# Patient Record
Sex: MALE | Race: WHITE | NOT HISPANIC OR LATINO | Employment: FULL TIME | URBAN - NONMETROPOLITAN AREA
[De-identification: names, ages, dates, MRNs, and addresses within clinical notes are randomized per-mention and may not be internally consistent; named-entity substitution may affect disease eponyms.]

---
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
## 2021-04-12 ENCOUNTER — OPTICAL OFFICE (OUTPATIENT)
Dept: URBAN - NONMETROPOLITAN AREA CLINIC 5 | Facility: CLINIC | Age: 21
Setting detail: OPHTHALMOLOGY
End: 2021-04-12
Payer: COMMERCIAL

## 2021-04-12 ENCOUNTER — DOCTOR'S OFFICE (OUTPATIENT)
Dept: URBAN - NONMETROPOLITAN AREA CLINIC 2 | Facility: CLINIC | Age: 21
Setting detail: OPHTHALMOLOGY
End: 2021-04-12
Payer: COMMERCIAL

## 2021-04-12 ENCOUNTER — RX ONLY (RX ONLY)
Age: 21
End: 2021-04-12

## 2021-04-12 DIAGNOSIS — H52.13: ICD-10-CM

## 2021-04-12 DIAGNOSIS — H52.223: ICD-10-CM

## 2021-04-12 PROCEDURE — V2103 SPHEROCYLINDR 4.00D/12-2.00D: HCPCS | Performed by: OPTOMETRIST

## 2021-04-12 PROCEDURE — 92015 DETERMINE REFRACTIVE STATE: CPT | Performed by: OPTOMETRIST

## 2021-04-12 PROCEDURE — 92310 CONTACT LENS FITTING OU: CPT | Performed by: OPTOMETRIST

## 2021-04-12 PROCEDURE — V2784 LENS POLYCARB OR EQUAL: HCPCS | Performed by: OPTOMETRIST

## 2021-04-12 PROCEDURE — V2020 VISION SVCS FRAMES PURCHASES: HCPCS | Performed by: OPTOMETRIST

## 2021-04-12 PROCEDURE — 92004 COMPRE OPH EXAM NEW PT 1/>: CPT | Performed by: OPTOMETRIST

## 2021-04-12 PROCEDURE — V2102 SINGL VISN SPHERE 7.12-20.00: HCPCS | Performed by: OPTOMETRIST

## 2021-04-12 ASSESSMENT — REFRACTION_AUTOREFRACTION
OS_CYLINDER: -1.50
OD_SPHERE: -1.25
OS_AXIS: 020
OS_SPHERE: -1.00
OD_CYLINDER: -3.25
OD_AXIS: 167

## 2021-04-12 ASSESSMENT — AXIALLENGTH_DERIVED
OS_AL: 23.9909
OS_AL: 24.2454
OD_AL: 23.9364
OD_AL: 23.4936

## 2021-04-12 ASSESSMENT — REFRACTION_MANIFEST
OS_CYLINDER: -1.25
OD_VA2: 20/25
OS_VA1: 20/25
OS_SPHERE: -0.50
OU_VA: 20/25
OD_SPHERE: -0.50
OD_VA1: 20/25
OD_CYLINDER: -2.50
OS_VA2: 20/25
OS_AXIS: 180
OD_AXIS: 170

## 2021-04-12 ASSESSMENT — SPHEQUIV_DERIVED
OD_SPHEQUIV: -2.875
OD_SPHEQUIV: -1.75
OS_SPHEQUIV: -1.125
OS_SPHEQUIV: -1.75

## 2021-04-12 ASSESSMENT — REFRACTION_CURRENTRX
OS_OVR_VA: 20/
OD_OVR_VA: 20/

## 2021-04-12 ASSESSMENT — KERATOMETRY
OD_K2POWER_DIOPTERS: 47.75
OD_K1POWER_DIOPTERS: 43.50
OS_K1POWER_DIOPTERS: 41.75
OS_K2POWER_DIOPTERS: 45.50
OS_AXISANGLE_DEGREES: 004
OD_AXISANGLE_DEGREES: 089

## 2021-04-12 ASSESSMENT — TONOMETRY
OS_IOP_MMHG: 14
OD_IOP_MMHG: 14

## 2021-04-12 ASSESSMENT — CONFRONTATIONAL VISUAL FIELD TEST (CVF)
OD_FINDINGS: FULL
OS_FINDINGS: FULL

## 2021-04-12 ASSESSMENT — VISUAL ACUITY
OS_BCVA: 20/40-2
OD_BCVA: 20/40-2

## 2021-04-28 ENCOUNTER — DOCTOR'S OFFICE (OUTPATIENT)
Dept: URBAN - NONMETROPOLITAN AREA CLINIC 2 | Facility: CLINIC | Age: 21
Setting detail: OPHTHALMOLOGY
End: 2021-04-28

## 2021-04-28 DIAGNOSIS — H52.13: ICD-10-CM

## 2021-04-28 PROCEDURE — CLFUP CONTACT LENS FOLLOW-UP: Performed by: OPTOMETRIST

## 2021-04-28 ASSESSMENT — REFRACTION_MANIFEST
OD_VA2: 20/25
OD_SPHERE: -0.50
OD_CYLINDER: -2.50
OS_VA2: 20/25
OU_VA: 20/25
OS_SPHERE: -0.50
OD_VA1: 20/25
OD_AXIS: 170
OS_AXIS: 180
OS_CYLINDER: -1.25
OS_VA1: 20/25

## 2021-04-28 ASSESSMENT — KERATOMETRY
OS_K2POWER_DIOPTERS: 45.50
OD_K2POWER_DIOPTERS: 47.75
OS_AXISANGLE_DEGREES: 004
OD_K1POWER_DIOPTERS: 43.50
OD_AXISANGLE_DEGREES: 089
OS_K1POWER_DIOPTERS: 41.75

## 2021-04-28 ASSESSMENT — VISUAL ACUITY
OD_BCVA: 20/20-1
OS_BCVA: 20/25

## 2021-04-28 ASSESSMENT — CONFRONTATIONAL VISUAL FIELD TEST (CVF)
OD_FINDINGS: FULL
OS_FINDINGS: FULL

## 2021-04-28 ASSESSMENT — REFRACTION_AUTOREFRACTION
OD_CYLINDER: -3.25
OD_AXIS: 167
OS_SPHERE: -1.00
OS_CYLINDER: -1.50
OS_AXIS: 020
OD_SPHERE: -1.25

## 2021-04-28 ASSESSMENT — AXIALLENGTH_DERIVED
OS_AL: 23.9909
OD_AL: 23.9364
OD_AL: 23.4936
OS_AL: 24.2454

## 2021-04-28 ASSESSMENT — SPHEQUIV_DERIVED
OS_SPHEQUIV: -1.75
OD_SPHEQUIV: -1.75
OD_SPHEQUIV: -2.875
OS_SPHEQUIV: -1.125

## 2021-04-28 ASSESSMENT — REFRACTION_CURRENTRX
OD_OVR_VA: 20/
OS_OVR_VA: 20/

## 2021-07-11 ENCOUNTER — HOSPITAL ENCOUNTER (EMERGENCY)
Facility: HOSPITAL | Age: 21
Discharge: HOME | End: 2021-07-11
Attending: EMERGENCY MEDICINE | Admitting: EMERGENCY MEDICINE
Payer: COMMERCIAL

## 2021-07-11 VITALS
TEMPERATURE: 98.2 F | DIASTOLIC BLOOD PRESSURE: 83 MMHG | WEIGHT: 315 LBS | OXYGEN SATURATION: 98 % | BODY MASS INDEX: 46.65 KG/M2 | SYSTOLIC BLOOD PRESSURE: 138 MMHG | HEART RATE: 80 BPM | RESPIRATION RATE: 18 BRPM | HEIGHT: 69 IN

## 2021-07-11 DIAGNOSIS — J06.9 UPPER RESPIRATORY TRACT INFECTION, UNSPECIFIED TYPE: Primary | ICD-10-CM

## 2021-07-11 PROCEDURE — 99283 EMERGENCY DEPT VISIT LOW MDM: CPT

## 2021-07-11 PROCEDURE — 99282 EMERGENCY DEPT VISIT SF MDM: CPT | Performed by: EMERGENCY MEDICINE

## 2021-07-11 RX ORDER — FLUTICASONE PROPIONATE 50 MCG
1 SPRAY, SUSPENSION (ML) NASAL DAILY
Qty: 16 G | Refills: 0 | Status: SHIPPED | OUTPATIENT
Start: 2021-07-11

## 2021-07-11 NOTE — DISCHARGE INSTRUCTIONS
No evidence strep throat on exam   I suspect either a viral syndrome or significant allergy type symptoms  Will treat with a nasal spray with steroid medication  Return if symptoms worsen or follow up with the primary care provider sooner  No indication for antibiotics at this time  As were clear  A not suspect pneumonia  Your coughing may continue for some time even weeks, the nasal spray medication can help with the symptoms  May continue Mucinex

## 2021-07-11 NOTE — ED PROVIDER NOTES
History  Chief Complaint   Patient presents with    Cough     x 2 days    Sore Throat     HPI  64-year-old male with no significant past medical history presents to emergency department today for evaluation of 2 days of URI symptoms  Patient reports 2 days scratchy dry throat, sore throat, postnasal drip, intermittent dry cough  Patient reports positive sick contact friends from [place] visiting with bronchitis  He reports prior history of COVID infection in November and is not seeking COVID testing at this time  He denies productive cough  He is taking nothing for his symptoms  Denies headache, nausea, vomiting, shortness of breath, weakness, diarrhea, abdominal pain, rash  Prior to Admission Medications   Prescriptions Last Dose Informant Patient Reported? Taking?    Ibuprofen (MOTRIN PO)   Yes No   Sig: Take by mouth   Naproxen Sodium (ALEVE PO)   Yes No   Sig: Take by mouth   citalopram (CeleXA) 10 mg tablet   Yes No   Sig: Take 10 mg by mouth daily   clonazePAM (KlonoPIN) 1 mg tablet   Yes No   Sig: Take 1 mg by mouth daily at bedtime   fluticasone (FLONASE) 50 mcg/act nasal spray   No No   Si spray into each nostril 2 (two) times a day for 14 days   Patient taking differently: 1 spray into each nostril as needed    ketorolac (TORADOL) 10 mg tablet   No No   Sig: Take 1 tablet (10 mg total) by mouth every 6 (six) hours as needed for severe pain   Patient not taking: Reported on 2020   naproxen (NAPROSYN) 500 mg tablet   No No   Sig: Take 1 tablet (500 mg total) by mouth 2 (two) times a day with meals   Patient not taking: Reported on 2/3/2020   ondansetron (ZOFRAN) 4 mg tablet   No No   Sig: Take 1 tablet (4 mg total) by mouth every 8 (eight) hours as needed for nausea or vomiting   Patient not taking: Reported on 2/3/2020   ondansetron (ZOFRAN-ODT) 4 mg disintegrating tablet   No No   Sig: Take 1 tablet (4 mg total) by mouth every 6 (six) hours as needed for nausea or vomiting   Patient not Referred by: Morenita Mancia MD; Medical Diagnosis (from order):    Diagnosis Information      Diagnosis    354.2 (ICD-9-CM) - G56.23 (ICD-10-CM) - Ulnar neuropathy of both upper extremities                Daily Treatment Note    Visit: 13    SUBJECTIVE                                                                                                             Had EMG on left. Negative for ulnar nerve, positive for carpal tunnel. Followed up with Dr. Nowak. cortisone injection at carpal tunnel. Monitor for 1 month, if not improved, surgery for both median and ulnar nerve on left.     Yellow putty too easy.  Less numbness noted on L due to cortisone.  \"Whatever we do on the L we will probably do on the R since the issues are the same.\"      OBJECTIVE                                                                                                                        TREATMENT                                                                                                                  Therapeutic Exercise:  Ulnar nerve flossing - 10 reps, 1 set on R  Ulnar nerve glides - 10 reps, 1 set on R  Wrist and digit flexors stretch keeping elbow extended - 20 second hold, 5 reps on R  Ulnar nerve glides - prayer stretch side to side   Median nerve glides - 5 reps  Red yellow putty mix  strengthening on R - 15 reps    Manual Therapy:  Performed on R today:  STM volar forearm  STM distal biceps  Transverse carpal ligament stretch  STM to thenar eminence      Skilled input: verbal instruction/cues and tactile instruction/cues    Writer verbally educated and received verbal consent for hand placement, positioning of patient, and techniques to be performed today from patient for clothing adjustments for techniques, therapist position for techniques, modality application and hand placement and palpation for techniques as described above and how they are pertinent to the patient's plan of care.    Home Exercise Program: Ulnar  nerve flossing  Ulnar nerve glides:   - swan to push away  Wrist flexor stretch with elbow extended  Wrist extensor stretch with elbow extended  Biceps/FA stretch again wall  Yellow putty gripping - 10 reps  1# wrist PREs - 10 reps  Mallet FA rotation - 10 reps    Ultrasound ([id number])  Location: R carpal tunnel  Position: sitting  Duty Cycle: 100%  Frequency: 3 Mhz  Intensity (w/cm2): 1.5  Intensity: patient comfort  Duration: 8 minutes  Results: improved circulation and improved range of motion  Reaction: no adverse reaction to treatment      ASSESSMENT                                                                                                             discontinued ionto due to skin irritation.  Treated R as carpal tunnel versus cubital tunnel per EMG on L to see if this assists in further relief.  Improved challenge with red yellow putty and no pain or symptoms reported.    Patient Education:   Results of above outlined education: Verbalizes understanding and Demonstrates understanding      PLAN                                                                                                                           Suggestions for next session as indicated: Progress per plan of care  US  STM  ionto if not causing skin irritation  Progress strengthening as tolerated          Therapy procedure time and total treatment time can be found documented on the Time Entry flowsheet   taking: Reported on 2020   ondansetron (ZOFRAN-ODT) 8 mg disintegrating tablet   No No   Sig: Take 1 tablet (8 mg total) by mouth every 8 (eight) hours as needed for nausea or vomiting   Patient not taking: Reported on 2020   tamsulosin (FLOMAX) 0 4 mg   No No   Sig: Take 1 capsule (0 4 mg total) by mouth daily   Patient not taking: Reported on 2020      Facility-Administered Medications: None       Past Medical History:   Diagnosis Date    Anxiety     Depression     Knee pain, right     Obesity        Past Surgical History:   Procedure Laterality Date    ARTHROSCOPY KNEE Right 2019    Procedure: ARTHROSCOPY KNEE WITH MPFL REPAIR;  Surgeon: Emiliana Jimenez MD;  Location: [facility] MAIN OR;  Service: Orthopedics    CYST REMOVAL      buttocks        Family History   Problem Relation Age of Onset    No Known Problems Father      I have reviewed and agree with the history as documented  E-Cigarette/Vaping    E-Cigarette Use Never User      E-Cigarette/Vaping Substances    Nicotine No     THC No     CBD No     Flavoring No     Other No     Unknown No      Social History     Tobacco Use    Smoking status: Former Smoker     Packs/day: 0 20     Quit date: 2019     Years since quittin 8    Smokeless tobacco: Former User     Quit date: 2019   Vaping Use    Vaping Use: Never used   Substance Use Topics    Alcohol use: No    Drug use: No       Review of Systems   Constitutional: Negative for chills and fever  HENT: Positive for congestion, rhinorrhea and sore throat  Negative for ear discharge and ear pain  Eyes: Negative for pain and visual disturbance  Respiratory: Positive for cough  Negative for shortness of breath  Cardiovascular: Negative for chest pain and palpitations  Gastrointestinal: Negative for abdominal pain and vomiting  Genitourinary: Negative for dysuria and hematuria  Musculoskeletal: Negative for arthralgias and back pain     Skin: Negative for color change and rash  Neurological: Negative for seizures and syncope  All other systems reviewed and are negative  Physical Exam  Physical Exam  Vitals and nursing note reviewed  Constitutional:       Appearance: He is well-developed  HENT:      Head: Normocephalic and atraumatic  Nose: Congestion present  Mouth/Throat:      Mouth: Mucous membranes are moist       Pharynx: Oropharynx is clear  Uvula midline  No oropharyngeal exudate, posterior oropharyngeal erythema or uvula swelling  Eyes:      Conjunctiva/sclera: Conjunctivae normal    Cardiovascular:      Rate and Rhythm: Normal rate and regular rhythm  Heart sounds: No murmur heard  Pulmonary:      Effort: Pulmonary effort is normal  No respiratory distress  Breath sounds: Normal breath sounds  Comments: Lungs clear to auscultation no wheezes rales rhonchi  Abdominal:      Palpations: Abdomen is soft  Tenderness: There is no abdominal tenderness  Musculoskeletal:      Cervical back: Neck supple  Skin:     General: Skin is warm and dry  Neurological:      Mental Status: He is alert           Vital Signs  ED Triage Vitals [07/11/21 1545]   Temperature Pulse Respirations Blood Pressure SpO2   98 2 °F (36 8 °C) 80 18 138/83 98 %      Temp Source Heart Rate Source Patient Position - Orthostatic VS BP Location FiO2 (%)   Tympanic -- Sitting Left arm --      Pain Score       --           Vitals:    07/11/21 1545   BP: 138/83   Pulse: 80   Patient Position - Orthostatic VS: Sitting         Visual Acuity      ED Medications  Medications - No data to display    Diagnostic Studies  Results Reviewed     None                 No orders to display              Procedures  Procedures         ED Course                                           MDM  Number of Diagnoses or Management Options  Upper respiratory tract infection, unspecified type: new and does not require workup  Risk of Complications, Morbidity, and/or Mortality  Presenting problems: low  Diagnostic procedures: low  Management options: low    Patient Progress  Patient progress: stable    15-year-old male with 2 days of mild URI symptoms  No evidence of strep throat on exam   Early in clinical course, unclear if related to allergic rhinitis with postnasal drip verses early 5 bronchitis  Patient walking without signs of bacterial infection will defer antibiotics at this time  Will provide prescription for Flonase nasal spray and PCP follow-up instructed  Patient will return for worsening symptoms  Disposition  Final diagnoses:   Upper respiratory tract infection, unspecified type     Time reflects when diagnosis was documented in both MDM as applicable and the Disposition within this note     Time User Action Codes Description Comment    7/11/2021  3:52 PM Shakeel Vazquez Add [J06 9] Upper respiratory tract infection, unspecified type       ED Disposition     ED Disposition Condition Date/Time Comment    Discharge Stable Sun Jul 11, 2021  3:51 PM Nancy Khan discharge to home/self care  Follow-up Information     Follow up With Specialties Details Why Contact Info    Jason Armstrong MD Pediatrics Schedule an appointment as soon as possible for a visit  If symptoms worsen [address] [facility]  [phone]            Patient's Medications   Discharge Prescriptions    FLUTICASONE (FLONASE) 50 MCG/ACT NASAL SPRAY    1 spray into each nostril daily       Start Date: 7/11/2021 End Date: --       Order Dose: 1 spray       Quantity: 16 g    Refills: 0     No discharge procedures on file      PDMP Review     None          ED Provider  Electronically Signed by           Berta Kirkpatrick DO  07/11/21 3862

## 2021-07-15 ENCOUNTER — TELEPHONE (OUTPATIENT)
Dept: FAMILY MEDICINE CLINIC | Facility: CLINIC | Age: 21
End: 2021-07-15

## 2021-09-23 ENCOUNTER — HOSPITAL ENCOUNTER (EMERGENCY)
Facility: HOSPITAL | Age: 21
Discharge: HOME | End: 2021-09-23
Attending: EMERGENCY MEDICINE | Admitting: EMERGENCY MEDICINE
Payer: COMMERCIAL

## 2021-09-23 ENCOUNTER — APPOINTMENT (EMERGENCY)
Dept: ULTRASOUND IMAGING | Facility: HOSPITAL | Age: 21
End: 2021-09-23
Payer: COMMERCIAL

## 2021-09-23 VITALS
DIASTOLIC BLOOD PRESSURE: 63 MMHG | SYSTOLIC BLOOD PRESSURE: 139 MMHG | HEART RATE: 74 BPM | BODY MASS INDEX: 44.61 KG/M2 | WEIGHT: 302.06 LBS | RESPIRATION RATE: 18 BRPM | OXYGEN SATURATION: 99 % | TEMPERATURE: 97.1 F

## 2021-09-23 DIAGNOSIS — R10.9 LEFT FLANK PAIN: Primary | ICD-10-CM

## 2021-09-23 LAB
BILIRUB UR QL STRIP: NEGATIVE
CLARITY UR: CLEAR
COLOR UR: YELLOW
GLUCOSE UR STRIP-MCNC: NEGATIVE MG/DL
HGB UR QL STRIP.AUTO: NEGATIVE
KETONES UR STRIP-MCNC: NEGATIVE MG/DL
LEUKOCYTE ESTERASE UR QL STRIP: NEGATIVE
NITRITE UR QL STRIP: NEGATIVE
PH UR STRIP.AUTO: 7 [PH]
PROT UR STRIP-MCNC: NEGATIVE MG/DL
SP GR UR STRIP.AUTO: 1.01 (ref 1–1.03)
UROBILINOGEN UR QL STRIP.AUTO: 0.2 E.U./DL

## 2021-09-23 PROCEDURE — 81003 URINALYSIS AUTO W/O SCOPE: CPT

## 2021-09-23 PROCEDURE — 76770 US EXAM ABDO BACK WALL COMP: CPT

## 2021-09-23 PROCEDURE — 99284 EMERGENCY DEPT VISIT MOD MDM: CPT

## 2021-09-23 PROCEDURE — 99284 EMERGENCY DEPT VISIT MOD MDM: CPT | Performed by: EMERGENCY MEDICINE

## 2021-09-23 RX ORDER — ONDANSETRON 4 MG/1
4 TABLET, ORALLY DISINTEGRATING ORAL EVERY 8 HOURS PRN
Qty: 20 TABLET | Refills: 0 | Status: SHIPPED | OUTPATIENT
Start: 2021-09-23

## 2021-09-23 NOTE — ED PROVIDER NOTES
History  Chief Complaint   Patient presents with    Back Pain     started with left sided flank pain today and vomiting  pt thinks he passed the kidney stone     64-year-old male presents with left flank pain which has resolved  He woke up with this this morning was mild felt similar to prior kidney stones he has been able to pass his kidney stone spontaneously about while at work the pain worsened wrapped around front he had an emesis x1 at time he was diaphoretic and then the pain let up  The pain is not returned he has thinks he passed a stone but wants to get checked out he has had no fever chills denying any trauma or falls he has no dysuria penile discharge or gross hematuria  Denies any cough or upper respiratory complaints           Prior to Admission Medications   Prescriptions Last Dose Informant Patient Reported? Taking?    Ibuprofen (MOTRIN PO) Not Taking at Unknown time  Yes No   Sig: Take by mouth   Patient not taking: Reported on 2021   Naproxen Sodium (ALEVE PO) Not Taking at Unknown time  Yes No   Sig: Take by mouth   Patient not taking: Reported on 2021   citalopram (CeleXA) 10 mg tablet Not Taking at Unknown time  Yes No   Sig: Take 10 mg by mouth daily   Patient not taking: Reported on 2021   clonazePAM (KlonoPIN) 1 mg tablet Not Taking at Unknown time  Yes No   Sig: Take 1 mg by mouth daily at bedtime   Patient not taking: Reported on 2021   fluticasone (FLONASE) 50 mcg/act nasal spray Not Taking at Unknown time  No No   Si spray into each nostril daily   Patient not taking: Reported on 2021   ketorolac (TORADOL) 10 mg tablet Not Taking at Unknown time  No No   Sig: Take 1 tablet (10 mg total) by mouth every 6 (six) hours as needed for severe pain   Patient not taking: Reported on 2020   naproxen (NAPROSYN) 500 mg tablet Not Taking at Unknown time  No No   Sig: Take 1 tablet (500 mg total) by mouth 2 (two) times a day with meals   Patient not taking: Reported on 2/3/2020   ondansetron (ZOFRAN) 4 mg tablet Not Taking at Unknown time  No No   Sig: Take 1 tablet (4 mg total) by mouth every 8 (eight) hours as needed for nausea or vomiting   Patient not taking: Reported on 2/3/2020   ondansetron (ZOFRAN-ODT) 4 mg disintegrating tablet Not Taking at Unknown time  No No   Sig: Take 1 tablet (4 mg total) by mouth every 6 (six) hours as needed for nausea or vomiting   Patient not taking: Reported on 2020   ondansetron (ZOFRAN-ODT) 8 mg disintegrating tablet Not Taking at Unknown time  No No   Sig: Take 1 tablet (8 mg total) by mouth every 8 (eight) hours as needed for nausea or vomiting   Patient not taking: Reported on 2020   tamsulosin (FLOMAX) 0 4 mg Not Taking at Unknown time  No No   Sig: Take 1 capsule (0 4 mg total) by mouth daily   Patient not taking: Reported on 2020      Facility-Administered Medications: None       Past Medical History:   Diagnosis Date    Anxiety     Depression     Knee pain, right     Obesity        Past Surgical History:   Procedure Laterality Date    ARTHROSCOPY KNEE Right 2019    Procedure: ARTHROSCOPY KNEE WITH MPFL REPAIR;  Surgeon: Saul Cook MD;  Location: [address] [place] [address] OR;  Service: Orthopedics    CYST REMOVAL      buttocks        Family History   Problem Relation Age of Onset    No Known Problems Father      I have reviewed and agree with the history as documented      E-Cigarette/Vaping    E-Cigarette Use Never User      E-Cigarette/Vaping Substances    Nicotine No     THC No     CBD No     Flavoring No     Other No     Unknown No      Social History     Tobacco Use    Smoking status: Former Smoker     Packs/day: 0 20     Quit date: 2019     Years since quittin 0    Smokeless tobacco: Former User     Quit date: 2019   Vaping Use    Vaping Use: Never used   Substance Use Topics    Alcohol use: No    Drug use: No       Review of Systems   Constitutional: Positive for diaphoresis (at the time he threw up)  Negative for activity change, chills and fever  HENT: Negative for congestion, ear pain, rhinorrhea, sneezing and sore throat  Eyes: Negative for discharge  Respiratory: Negative for cough and shortness of breath  Cardiovascular: Negative for chest pain and leg swelling  Gastrointestinal: Positive for vomiting (*1)  Negative for abdominal distention, abdominal pain, blood in stool, diarrhea and nausea  Endocrine: Negative for polyuria  Genitourinary: Positive for flank pain (left resovled)  Negative for dysuria, frequency and urgency  Musculoskeletal: Negative for back pain and myalgias  Skin: Negative for rash  Neurological: Negative for dizziness, weakness, light-headedness, numbness and headaches  Hematological: Negative for adenopathy  Psychiatric/Behavioral: Negative for confusion  All other systems reviewed and are negative  Physical Exam  Physical Exam  Vitals and nursing note reviewed  Constitutional:       General: He is not in acute distress  Appearance: He is well-developed  He is not ill-appearing, toxic-appearing or diaphoretic  HENT:      Head: Normocephalic and atraumatic  Right Ear: External ear normal       Left Ear: External ear normal       Nose: Nose normal       Mouth/Throat:      Mouth: Mucous membranes are moist    Eyes:      General: No scleral icterus  Right eye: No discharge  Left eye: No discharge  Conjunctiva/sclera: Conjunctivae normal       Pupils: Pupils are equal, round, and reactive to light  Cardiovascular:      Rate and Rhythm: Normal rate and regular rhythm  Heart sounds: Normal heart sounds  Pulmonary:      Effort: Pulmonary effort is normal  No respiratory distress  Breath sounds: Normal breath sounds  No stridor  No wheezing, rhonchi or rales  Abdominal:      General: Bowel sounds are normal  There is no distension  Palpations: Abdomen is soft  There is no mass  Tenderness: There is no abdominal tenderness  There is no right CVA tenderness, left CVA tenderness, guarding or rebound  Comments: Back: no mildline or CVA tenderness   Musculoskeletal:         General: No tenderness or deformity  Normal range of motion  Cervical back: Normal range of motion  No rigidity or tenderness  Lymphadenopathy:      Cervical: No cervical adenopathy  Skin:     General: Skin is warm and dry  Neurological:      Mental Status: He is alert and oriented to person, place, and time  Cranial Nerves: No cranial nerve deficit  Sensory: No sensory deficit  Motor: No weakness or abnormal muscle tone        Coordination: Coordination normal       Deep Tendon Reflexes: Reflexes normal       Comments: Gait steady   Psychiatric:         Mood and Affect: Mood normal          Vital Signs  ED Triage Vitals [09/23/21 1407]   Temperature Pulse Respirations Blood Pressure SpO2   (!) 97 1 °F (36 2 °C) 74 18 139/63 99 %      Temp Source Heart Rate Source Patient Position - Orthostatic VS BP Location FiO2 (%)   Temporal Monitor Sitting Left arm --      Pain Score       --           Vitals:    09/23/21 1407   BP: 139/63   Pulse: 74   Patient Position - Orthostatic VS: Sitting         Visual Acuity      ED Medications  Medications - No data to display    Diagnostic Studies  Results Reviewed     Procedure Component Value Units Date/Time    UA w Reflex to Microscopic w Reflex to Culture [[id number]] Collected: 09/23/21 1435    Lab Status: Final result Specimen: Urine, Clean Catch Updated: 09/23/21 1441     Color, UA Yellow     Clarity, UA Clear     Specific Gravity, UA 1 015     pH, UA 7 0     Leukocytes, UA Negative     Nitrite, UA Negative     Protein, UA Negative mg/dl      Glucose, UA Negative mg/dl      Ketones, UA Negative mg/dl      Urobilinogen, UA 0 2 E U /dl      Bilirubin, UA Negative     Blood, UA Negative                 US kidney and bladder   Final Result by Leoncio Cristobal, DO (09/23 1627)   No renal calculi identified by ultrasound  No hydronephrosis  Both ureteral jets are visualized confirming ureteral patency  Unremarkable urinary bladder  Workstation performed: [id number]                    Procedures  Procedures         ED Course                                           MDM  Number of Diagnoses or Management Options  Left flank pain  Diagnosis management comments: Mdm:  Severe left flank pain which has resolved similar to prior kidney stones  At this time UA is negative concern for possibility of an obstructed stone will proceed with ultrasound of the kidneys to evaluate for possibility of hypodronephropsis and eval ureteral jets  otherwise will recommend continued symptomatic management with NSAIDs antiemetics fluids and urology follow-up as needed      Disposition  Final diagnoses:   Left flank pain - resovled likely passed stone     Time reflects when diagnosis was documented in both MDM as applicable and the Disposition within this note     Time User Action Codes Description Comment    9/23/2021  4:56 PM Charlee Gallo Add [R10 9] Left flank pain     9/23/2021  4:57 PM Charlee Gallo Modify [R10 9] Left flank pain resovled likely passed stone      ED Disposition     ED Disposition Condition Date/Time Comment    Discharge Stable Thu Sep 23, 2021  4:56 PM Daisy Portillo discharge to home/self care              Follow-up Information     Follow up With Specialties Details Why Contact Info Additional [address] Urology [facility] Urology Call  As needed recurrent flank pain fever nausea vomiting [address] [phone]  707  [facility], [address] [place] [address]          Discharge Medication List as of 9/23/2021  5:00 PM      START taking these medications    Details   !! ondansetron (ZOFRAN-ODT) 4 mg disintegrating tablet Take 1 tablet (4 mg total) by mouth every 8 (eight) hours as needed for nausea or vomiting for up to 20 doses, Starting Thu 9/23/2021, Normal       !! - Potential duplicate medications found  Please discuss with provider  CONTINUE these medications which have NOT CHANGED    Details   citalopram (CeleXA) 10 mg tablet Take 10 mg by mouth daily, Historical Med      clonazePAM (KlonoPIN) 1 mg tablet Take 1 mg by mouth daily at bedtime, Historical Med      fluticasone (FLONASE) 50 mcg/act nasal spray 1 spray into each nostril daily, Starting Sun 7/11/2021, Normal      Ibuprofen (MOTRIN PO) Take by mouth, Historical Med      ketorolac (TORADOL) 10 mg tablet Take 1 tablet (10 mg total) by mouth every 6 (six) hours as needed for severe pain, Starting Tue 9/22/2020, Normal      naproxen (NAPROSYN) 500 mg tablet Take 1 tablet (500 mg total) by mouth 2 (two) times a day with meals, Starting Thu 11/14/2019, Normal      Naproxen Sodium (ALEVE PO) Take by mouth, Historical Med      ondansetron (ZOFRAN) 4 mg tablet Take 1 tablet (4 mg total) by mouth every 8 (eight) hours as needed for nausea or vomiting, Starting Thu 11/14/2019, Normal      !! ondansetron (ZOFRAN-ODT) 4 mg disintegrating tablet Take 1 tablet (4 mg total) by mouth every 6 (six) hours as needed for nausea or vomiting, Starting Tue 9/22/2020, Normal      !! ondansetron (ZOFRAN-ODT) 8 mg disintegrating tablet Take 1 tablet (8 mg total) by mouth every 8 (eight) hours as needed for nausea or vomiting, Starting Mon 2/10/2020, Normal      tamsulosin (FLOMAX) 0 4 mg Take 1 capsule (0 4 mg total) by mouth daily, Starting Tue 9/22/2020, Normal       !! - Potential duplicate medications found  Please discuss with provider  No discharge procedures on file      PDMP Review     None          ED Provider  Electronically Signed by           Margaret Ann MD  09/23/21 3616

## 2021-09-23 NOTE — Clinical Note
Jalen Ethelamrita was seen and treated in our emergency department on 9/23/2021  Diagnosis:     Jinger Merlin  may return to work on return date  He may return on this date: 09/25/2021         If you have any questions or concerns, please don't hesitate to call        Neelima Naqvi MD    ______________________________           _______________          _______________  Hospital Representative                              Date                                Time

## 2021-09-23 NOTE — DISCHARGE INSTRUCTIONS
Plenty of fluids , drink enough to make 1 gallon of urine daily; add lemon or lime to twice daily to acidify urine to help prevent stones  Strain urine save stone  Flomax 0 4mg daily after same meal daily be careful with position changes  Zofran for nausea as needed    Ibuprofen 400-600mg every 6 hours  OR Aleve 1-2 tablets twice daily as needed for pain  Take either medication with food  Return with fever, inability to keep fluids down, lightheaded, worsening or change in pain, not peeing every 6-8 hours or any new or worsening symptoms

## 2021-10-07 ENCOUNTER — APPOINTMENT (OUTPATIENT)
Dept: LAB | Facility: MEDICAL CENTER | Age: 21
End: 2021-10-07
Payer: COMMERCIAL

## 2021-10-07 DIAGNOSIS — E55.9 VITAMIN D DEFICIENCY: ICD-10-CM

## 2021-10-07 DIAGNOSIS — E66.01 MORBID OBESITY (HCC): ICD-10-CM

## 2021-10-07 LAB
25(OH)D3 SERPL-MCNC: 28.3 NG/ML (ref 30–100)
ALBUMIN SERPL BCP-MCNC: 3.9 G/DL (ref 3.5–5)
ALP SERPL-CCNC: 68 U/L (ref 46–116)
ALT SERPL W P-5'-P-CCNC: 93 U/L (ref 12–78)
ANION GAP SERPL CALCULATED.3IONS-SCNC: 5 MMOL/L (ref 4–13)
AST SERPL W P-5'-P-CCNC: 31 U/L (ref 5–45)
BILIRUB SERPL-MCNC: 0.44 MG/DL (ref 0.2–1)
BUN SERPL-MCNC: 10 MG/DL (ref 5–25)
CALCIUM SERPL-MCNC: 9.9 MG/DL (ref 8.3–10.1)
CHLORIDE SERPL-SCNC: 105 MMOL/L (ref 100–108)
CHOLEST SERPL-MCNC: 152 MG/DL (ref 50–200)
CO2 SERPL-SCNC: 28 MMOL/L (ref 21–32)
CREAT SERPL-MCNC: 0.82 MG/DL (ref 0.6–1.3)
EST. AVERAGE GLUCOSE BLD GHB EST-MCNC: 117 MG/DL
GFR SERPL CREATININE-BSD FRML MDRD: 127 ML/MIN/1.73SQ M
GLUCOSE P FAST SERPL-MCNC: 94 MG/DL (ref 65–99)
HBA1C MFR BLD: 5.7 %
HDLC SERPL-MCNC: 41 MG/DL
INSULIN SERPL-ACNC: 59.7 MU/L (ref 3–25)
LDLC SERPL CALC-MCNC: 91 MG/DL (ref 0–100)
POTASSIUM SERPL-SCNC: 4 MMOL/L (ref 3.5–5.3)
PROT SERPL-MCNC: 8.1 G/DL (ref 6.4–8.2)
SODIUM SERPL-SCNC: 138 MMOL/L (ref 136–145)
TRIGL SERPL-MCNC: 101 MG/DL

## 2021-10-07 PROCEDURE — 82533 TOTAL CORTISOL: CPT

## 2021-10-07 PROCEDURE — 80061 LIPID PANEL: CPT

## 2021-10-07 PROCEDURE — 80053 COMPREHEN METABOLIC PANEL: CPT

## 2021-10-07 PROCEDURE — 83525 ASSAY OF INSULIN: CPT

## 2021-10-07 PROCEDURE — 83036 HEMOGLOBIN GLYCOSYLATED A1C: CPT

## 2021-10-07 PROCEDURE — 82306 VITAMIN D 25 HYDROXY: CPT

## 2021-10-07 PROCEDURE — 36415 COLL VENOUS BLD VENIPUNCTURE: CPT

## 2021-10-08 LAB — CORTIS SERPL-MCNC: 0.6 UG/DL

## 2021-10-25 PROCEDURE — 99283 EMERGENCY DEPT VISIT LOW MDM: CPT

## 2021-10-26 ENCOUNTER — HOSPITAL ENCOUNTER (EMERGENCY)
Facility: HOSPITAL | Age: 21
Discharge: HOME | End: 2021-10-26
Attending: EMERGENCY MEDICINE
Payer: COMMERCIAL

## 2021-10-26 VITALS
BODY MASS INDEX: 46.65 KG/M2 | OXYGEN SATURATION: 97 % | HEART RATE: 85 BPM | SYSTOLIC BLOOD PRESSURE: 144 MMHG | TEMPERATURE: 97.1 F | DIASTOLIC BLOOD PRESSURE: 93 MMHG | WEIGHT: 315 LBS | HEIGHT: 69 IN | RESPIRATION RATE: 18 BRPM

## 2021-10-26 DIAGNOSIS — L05.01 PILONIDAL ABSCESS: Primary | ICD-10-CM

## 2021-10-26 PROCEDURE — 99283 EMERGENCY DEPT VISIT LOW MDM: CPT | Performed by: EMERGENCY MEDICINE

## 2021-10-26 PROCEDURE — 96372 THER/PROPH/DIAG INJ SC/IM: CPT

## 2021-10-26 PROCEDURE — 10080 I&D PILONIDAL CYST SIMPLE: CPT | Performed by: EMERGENCY MEDICINE

## 2021-10-26 RX ORDER — KETOROLAC TROMETHAMINE 30 MG/ML
30 INJECTION, SOLUTION INTRAMUSCULAR; INTRAVENOUS ONCE
Status: COMPLETED | OUTPATIENT
Start: 2021-10-26 | End: 2021-10-26

## 2021-10-26 RX ORDER — AMOXICILLIN AND CLAVULANATE POTASSIUM 875; 125 MG/1; MG/1
1 TABLET, FILM COATED ORAL EVERY 12 HOURS
Qty: 14 TABLET | Refills: 0 | Status: SHIPPED | OUTPATIENT
Start: 2021-10-26 | End: 2021-11-02

## 2021-10-26 RX ORDER — IBUPROFEN 800 MG/1
800 TABLET ORAL EVERY 8 HOURS PRN
Qty: 30 TABLET | Refills: 0 | Status: SHIPPED | OUTPATIENT
Start: 2021-10-26 | End: 2021-11-05

## 2021-10-26 RX ORDER — LIDOCAINE HYDROCHLORIDE AND EPINEPHRINE 10; 10 MG/ML; UG/ML
10 INJECTION, SOLUTION INFILTRATION; PERINEURAL ONCE
Status: COMPLETED | OUTPATIENT
Start: 2021-10-26 | End: 2021-10-26

## 2021-10-26 RX ORDER — BACITRACIN, NEOMYCIN, POLYMYXIN B 400; 3.5; 5 [USP'U]/G; MG/G; [USP'U]/G
1 OINTMENT TOPICAL ONCE
Status: COMPLETED | OUTPATIENT
Start: 2021-10-26 | End: 2021-10-26

## 2021-10-26 RX ADMIN — KETOROLAC TROMETHAMINE 30 MG: 30 INJECTION, SOLUTION INTRAMUSCULAR at 02:13

## 2021-10-26 RX ADMIN — LIDOCAINE HYDROCHLORIDE,EPINEPHRINE BITARTRATE 10 ML: 10; .01 INJECTION, SOLUTION INFILTRATION; PERINEURAL at 02:14

## 2021-10-26 RX ADMIN — BACITRACIN, NEOMYCIN, POLYMYXIN B 1 SMALL APPLICATION: 400; 3.5; 5 OINTMENT TOPICAL at 03:16

## 2021-10-27 ENCOUNTER — HOSPITAL ENCOUNTER (EMERGENCY)
Facility: HOSPITAL | Age: 21
Discharge: HOME | End: 2021-10-27
Attending: EMERGENCY MEDICINE
Payer: COMMERCIAL

## 2021-10-27 ENCOUNTER — APPOINTMENT (EMERGENCY)
Dept: CT IMAGING | Facility: HOSPITAL | Age: 21
End: 2021-10-27
Payer: COMMERCIAL

## 2021-10-27 VITALS
HEART RATE: 84 BPM | OXYGEN SATURATION: 99 % | DIASTOLIC BLOOD PRESSURE: 77 MMHG | WEIGHT: 315 LBS | HEIGHT: 69 IN | RESPIRATION RATE: 18 BRPM | BODY MASS INDEX: 46.65 KG/M2 | TEMPERATURE: 98.5 F | SYSTOLIC BLOOD PRESSURE: 115 MMHG

## 2021-10-27 DIAGNOSIS — L05.01 PILONIDAL CYST WITH ABSCESS: Primary | ICD-10-CM

## 2021-10-27 LAB
ALBUMIN SERPL BCP-MCNC: 3.9 G/DL (ref 3.5–5)
ALP SERPL-CCNC: 65 U/L (ref 46–116)
ALT SERPL W P-5'-P-CCNC: 84 U/L (ref 12–78)
ANION GAP SERPL CALCULATED.3IONS-SCNC: 9 MMOL/L (ref 4–13)
AST SERPL W P-5'-P-CCNC: 31 U/L (ref 5–45)
BASOPHILS # BLD AUTO: 0.06 THOUSANDS/ΜL (ref 0–0.1)
BASOPHILS NFR BLD AUTO: 0 % (ref 0–1)
BILIRUB SERPL-MCNC: 0.22 MG/DL (ref 0.2–1)
BUN SERPL-MCNC: 14 MG/DL (ref 5–25)
CALCIUM SERPL-MCNC: 9.4 MG/DL (ref 8.3–10.1)
CHLORIDE SERPL-SCNC: 106 MMOL/L (ref 100–108)
CO2 SERPL-SCNC: 28 MMOL/L (ref 21–32)
CREAT SERPL-MCNC: 1.01 MG/DL (ref 0.6–1.3)
EOSINOPHIL # BLD AUTO: 0.22 THOUSAND/ΜL (ref 0–0.61)
EOSINOPHIL NFR BLD AUTO: 2 % (ref 0–6)
ERYTHROCYTE [DISTWIDTH] IN BLOOD BY AUTOMATED COUNT: 12.7 % (ref 11.6–15.1)
GFR SERPL CREATININE-BSD FRML MDRD: 107 ML/MIN/1.73SQ M
GLUCOSE SERPL-MCNC: 98 MG/DL (ref 65–140)
HCT VFR BLD AUTO: 40.4 % (ref 36.5–49.3)
HGB BLD-MCNC: 13.1 G/DL (ref 12–17)
IMM GRANULOCYTES # BLD AUTO: 0.05 THOUSAND/UL (ref 0–0.2)
IMM GRANULOCYTES NFR BLD AUTO: 0 % (ref 0–2)
LYMPHOCYTES # BLD AUTO: 4.26 THOUSANDS/ΜL (ref 0.6–4.47)
LYMPHOCYTES NFR BLD AUTO: 30 % (ref 14–44)
MCH RBC QN AUTO: 28.4 PG (ref 26.8–34.3)
MCHC RBC AUTO-ENTMCNC: 32.4 G/DL (ref 31.4–37.4)
MCV RBC AUTO: 88 FL (ref 82–98)
MONOCYTES # BLD AUTO: 0.95 THOUSAND/ΜL (ref 0.17–1.22)
MONOCYTES NFR BLD AUTO: 7 % (ref 4–12)
NEUTROPHILS # BLD AUTO: 8.52 THOUSANDS/ΜL (ref 1.85–7.62)
NEUTS SEG NFR BLD AUTO: 61 % (ref 43–75)
NRBC BLD AUTO-RTO: 0 /100 WBCS
PLATELET # BLD AUTO: 336 THOUSANDS/UL (ref 149–390)
PMV BLD AUTO: 9.2 FL (ref 8.9–12.7)
POTASSIUM SERPL-SCNC: 3.8 MMOL/L (ref 3.5–5.3)
PROT SERPL-MCNC: 7.8 G/DL (ref 6.4–8.2)
RBC # BLD AUTO: 4.61 MILLION/UL (ref 3.88–5.62)
SODIUM SERPL-SCNC: 143 MMOL/L (ref 136–145)
WBC # BLD AUTO: 14.06 THOUSAND/UL (ref 4.31–10.16)

## 2021-10-27 PROCEDURE — 80053 COMPREHEN METABOLIC PANEL: CPT | Performed by: EMERGENCY MEDICINE

## 2021-10-27 PROCEDURE — 85025 COMPLETE CBC W/AUTO DIFF WBC: CPT | Performed by: EMERGENCY MEDICINE

## 2021-10-27 PROCEDURE — 74177 CT ABD & PELVIS W/CONTRAST: CPT

## 2021-10-27 PROCEDURE — 36415 COLL VENOUS BLD VENIPUNCTURE: CPT | Performed by: EMERGENCY MEDICINE

## 2021-10-27 PROCEDURE — 96361 HYDRATE IV INFUSION ADD-ON: CPT

## 2021-10-27 PROCEDURE — 99024 POSTOP FOLLOW-UP VISIT: CPT | Performed by: EMERGENCY MEDICINE

## 2021-10-27 PROCEDURE — 10080 I&D PILONIDAL CYST SIMPLE: CPT | Performed by: EMERGENCY MEDICINE

## 2021-10-27 PROCEDURE — 99284 EMERGENCY DEPT VISIT MOD MDM: CPT

## 2021-10-27 PROCEDURE — 96374 THER/PROPH/DIAG INJ IV PUSH: CPT

## 2021-10-27 RX ORDER — KETOROLAC TROMETHAMINE 30 MG/ML
30 INJECTION, SOLUTION INTRAMUSCULAR; INTRAVENOUS ONCE
Status: COMPLETED | OUTPATIENT
Start: 2021-10-27 | End: 2021-10-27

## 2021-10-27 RX ORDER — LIDOCAINE HYDROCHLORIDE AND EPINEPHRINE 10; 10 MG/ML; UG/ML
10 INJECTION, SOLUTION INFILTRATION; PERINEURAL ONCE
Status: COMPLETED | OUTPATIENT
Start: 2021-10-27 | End: 2021-10-27

## 2021-10-27 RX ADMIN — IOHEXOL 100 ML: 350 INJECTION, SOLUTION INTRAVENOUS at 02:50

## 2021-10-27 RX ADMIN — SODIUM CHLORIDE 1000 ML: 0.9 INJECTION, SOLUTION INTRAVENOUS at 03:22

## 2021-10-27 RX ADMIN — LIDOCAINE HYDROCHLORIDE,EPINEPHRINE BITARTRATE 10 ML: 10; .01 INJECTION, SOLUTION INFILTRATION; PERINEURAL at 04:51

## 2021-10-27 RX ADMIN — KETOROLAC TROMETHAMINE 30 MG: 30 INJECTION, SOLUTION INTRAMUSCULAR; INTRAVENOUS at 03:22

## 2021-11-03 ENCOUNTER — OFFICE VISIT (OUTPATIENT)
Dept: SURGERY | Facility: CLINIC | Age: 21
End: 2021-11-03
Payer: COMMERCIAL

## 2021-11-03 VITALS
SYSTOLIC BLOOD PRESSURE: 145 MMHG | TEMPERATURE: 98 F | BODY MASS INDEX: 46.65 KG/M2 | HEART RATE: 63 BPM | HEIGHT: 69 IN | DIASTOLIC BLOOD PRESSURE: 85 MMHG | WEIGHT: 315 LBS

## 2021-11-03 DIAGNOSIS — L05.91 PILONIDAL CYST: Primary | ICD-10-CM

## 2021-11-03 PROCEDURE — 99204 OFFICE O/P NEW MOD 45 MIN: CPT | Performed by: SURGERY

## 2021-11-08 PROBLEM — L05.91 PILONIDAL CYST: Status: ACTIVE | Noted: 2021-11-08

## 2021-11-11 ENCOUNTER — PATIENT OUTREACH (OUTPATIENT)
Dept: CASE MANAGEMENT | Facility: OTHER | Age: 21
End: 2021-11-11

## 2021-11-22 ENCOUNTER — PATIENT OUTREACH (OUTPATIENT)
Dept: CASE MANAGEMENT | Facility: OTHER | Age: 21
End: 2021-11-22

## 2022-01-03 ENCOUNTER — OFFICE VISIT (OUTPATIENT)
Dept: SURGERY | Facility: CLINIC | Age: 22
End: 2022-01-03
Payer: COMMERCIAL

## 2022-01-03 VITALS
WEIGHT: 315 LBS | HEART RATE: 63 BPM | BODY MASS INDEX: 46.65 KG/M2 | SYSTOLIC BLOOD PRESSURE: 147 MMHG | HEIGHT: 69 IN | DIASTOLIC BLOOD PRESSURE: 63 MMHG | TEMPERATURE: 98.5 F

## 2022-01-03 DIAGNOSIS — L05.91 PILONIDAL CYST: Primary | ICD-10-CM

## 2022-01-03 PROCEDURE — 99212 OFFICE O/P EST SF 10 MIN: CPT | Performed by: SURGERY

## 2022-01-03 NOTE — PROGRESS NOTES
Assessment/Plan:    Pilonidal cyst  Status post incision drainage now with well-healed incision  This time surgical intervention versus conservative management was discussed  Patient states this point time he would prefer to hold off on surgery  He has just started a new job and considering the fact that he has a flare up once a year he would prefer to put off surgery for now  I think this is reasonable  Signs and symptoms recurrence were discussed  On exam he does have 2 small pits that were noted  Again if patient has any additional flare-ups he should follow-up for further discussion of surgery  Diagnoses and all orders for this visit:    Pilonidal cyst          Subjective:      Patient ID: Alejandro White is a 22 y o  male  70-year-old gentleman with known history of pilonidal cyst status post incision drainage presents today for follow-up  Overall doing well  No nausea vomiting fevers or chills  Just recently started a new job as of today  States that the area has healed up completely  Denies any pain  No drainage  The following portions of the patient's history were reviewed and updated as appropriate:   He  has a past medical history of Anxiety, Depression, Knee pain, right, and Obesity  He   Patient Active Problem List    Diagnosis Date Noted    Pilonidal cyst 11/08/2021    Patellar dislocation, right, subsequent encounter 10/11/2019     He  has a past surgical history that includes Cyst Removal and ARTHROSCOPY KNEE (Right, 11/14/2019)  His family history includes No Known Problems in his father  He  reports that he quit smoking about 2 years ago  He smoked 0 20 packs per day  He quit smokeless tobacco use about 2 years ago  He reports that he does not drink alcohol and does not use drugs    Current Outpatient Medications   Medication Sig Dispense Refill    citalopram (CeleXA) 10 mg tablet Take 10 mg by mouth daily       clonazePAM (KlonoPIN) 1 mg tablet Take 1 mg by mouth daily at bedtime (Patient not taking: Reported on 9/23/2021)      fluticasone (FLONASE) 50 mcg/act nasal spray 1 spray into each nostril daily (Patient not taking: Reported on 9/23/2021) 16 g 0    Ibuprofen (MOTRIN PO) Take by mouth (Patient not taking: Reported on 9/23/2021)      ibuprofen (MOTRIN) 800 mg tablet Take 1 tablet (800 mg total) by mouth every 8 (eight) hours as needed for mild pain or moderate pain for up to 10 days (Patient not taking: Reported on 11/3/2021) 30 tablet 0    ketorolac (TORADOL) 10 mg tablet Take 1 tablet (10 mg total) by mouth every 6 (six) hours as needed for severe pain (Patient not taking: Reported on 12/18/2020) 20 tablet 0    naproxen (NAPROSYN) 500 mg tablet Take 1 tablet (500 mg total) by mouth 2 (two) times a day with meals (Patient not taking: Reported on 2/3/2020) 60 tablet 1    Naproxen Sodium (ALEVE PO) Take by mouth (Patient not taking: Reported on 9/23/2021)      ondansetron (ZOFRAN) 4 mg tablet Take 1 tablet (4 mg total) by mouth every 8 (eight) hours as needed for nausea or vomiting (Patient not taking: Reported on 2/3/2020) 10 tablet 0    ondansetron (ZOFRAN-ODT) 4 mg disintegrating tablet Take 1 tablet (4 mg total) by mouth every 6 (six) hours as needed for nausea or vomiting (Patient not taking: Reported on 12/18/2020) 10 tablet 0    ondansetron (ZOFRAN-ODT) 4 mg disintegrating tablet Take 1 tablet (4 mg total) by mouth every 8 (eight) hours as needed for nausea or vomiting for up to 20 doses (Patient not taking: Reported on 11/3/2021) 20 tablet 0    ondansetron (ZOFRAN-ODT) 8 mg disintegrating tablet Take 1 tablet (8 mg total) by mouth every 8 (eight) hours as needed for nausea or vomiting (Patient not taking: Reported on 9/17/2020) 20 tablet 0    tamsulosin (FLOMAX) 0 4 mg Take 1 capsule (0 4 mg total) by mouth daily (Patient not taking: Reported on 12/18/2020) 7 capsule 0     No current facility-administered medications for this visit       He has No Known Allergies       Review of Systems   Constitutional: Negative for activity change, appetite change, chills, diaphoresis, fatigue and fever  Gastrointestinal: Negative for abdominal pain  Skin: Negative for color change, pallor, rash and wound  Objective:      /63   Pulse 63   Temp 98 5 °F (36 9 °C)   Ht 5' 9" (1 753 m)   Wt (!) 180 kg (397 lb)   BMI 58 63 kg/m²          Physical Exam  Vitals reviewed  Constitutional:       General: He is not in acute distress  Appearance: Normal appearance  He is obese  He is not ill-appearing, toxic-appearing or diaphoretic  HENT:      Head: Normocephalic and atraumatic  Right Ear: External ear normal       Left Ear: External ear normal    Eyes:      General: No scleral icterus  Right eye: No discharge  Left eye: No discharge  Cardiovascular:      Rate and Rhythm: Normal rate  Pulmonary:      Effort: Pulmonary effort is normal  No respiratory distress  Musculoskeletal:         General: No deformity or signs of injury  Normal range of motion  Cervical back: Normal range of motion  Skin:     General: Skin is warm  Coloration: Skin is not jaundiced or pale  Findings: No bruising  Comments: Well-healed incision at the missy cleft from previous incision drainage  No active drainage  No erythema  Nontender to palpation  There is obvious air evidence of to sinus pits noted  Neurological:      General: No focal deficit present  Mental Status: He is alert and oriented to person, place, and time  Cranial Nerves: No cranial nerve deficit  Psychiatric:         Mood and Affect: Mood normal          Behavior: Behavior normal          Thought Content:  Thought content normal          Judgment: Judgment normal

## 2022-01-03 NOTE — ASSESSMENT & PLAN NOTE
Status post incision drainage now with well-healed incision  This time surgical intervention versus conservative management was discussed  Patient states this point time he would prefer to hold off on surgery  He has just started a new job and considering the fact that he has a flare up once a year he would prefer to put off surgery for now  I think this is reasonable  Signs and symptoms recurrence were discussed  On exam he does have 2 small pits that were noted  Again if patient has any additional flare-ups he should follow-up for further discussion of surgery

## 2022-01-18 ENCOUNTER — HOSPITAL ENCOUNTER (EMERGENCY)
Facility: HOSPITAL | Age: 22
Discharge: HOME | End: 2022-01-18
Attending: EMERGENCY MEDICINE | Admitting: EMERGENCY MEDICINE
Payer: COMMERCIAL

## 2022-01-18 ENCOUNTER — APPOINTMENT (EMERGENCY)
Dept: RADIOLOGY | Facility: HOSPITAL | Age: 22
End: 2022-01-18
Payer: COMMERCIAL

## 2022-01-18 VITALS
HEART RATE: 72 BPM | DIASTOLIC BLOOD PRESSURE: 78 MMHG | WEIGHT: 315 LBS | TEMPERATURE: 98.7 F | OXYGEN SATURATION: 99 % | BODY MASS INDEX: 56.12 KG/M2 | SYSTOLIC BLOOD PRESSURE: 155 MMHG | RESPIRATION RATE: 20 BRPM

## 2022-01-18 DIAGNOSIS — S93.602A FOOT SPRAIN, LEFT, INITIAL ENCOUNTER: Primary | ICD-10-CM

## 2022-01-18 PROCEDURE — 73630 X-RAY EXAM OF FOOT: CPT

## 2022-01-18 PROCEDURE — 99284 EMERGENCY DEPT VISIT MOD MDM: CPT | Performed by: EMERGENCY MEDICINE

## 2022-01-18 PROCEDURE — 99283 EMERGENCY DEPT VISIT LOW MDM: CPT

## 2022-01-18 RX ORDER — IBUPROFEN 600 MG/1
600 TABLET ORAL EVERY 6 HOURS PRN
Qty: 30 TABLET | Refills: 0 | Status: SHIPPED | OUTPATIENT
Start: 2022-01-18 | End: 2022-06-06 | Stop reason: SDUPTHER

## 2022-01-18 NOTE — Clinical Note
Peyman Hess was seen and treated in our emergency department on 1/18/2022  Diagnosis:     Ayden Rudd    He may return on this date:     Please allow Ayden Rudd to sit as much as possible secondary to left foot injury  If you have any questions or concerns, please don't hesitate to call        Shirley Cole, DO    ______________________________           _______________          _______________  Hospital Representative                              Date                                Time

## 2022-01-18 NOTE — ED PROVIDER NOTES
History  Chief Complaint   Patient presents with    Foot Pain     having left foot pain for 3 days  cant apply pressure to same     Patient is a 52-year-old male complaining of 3 days of left foot pain  States pain is worsened when moving and ambulating and putting pressure on it  He denies any recent injury or trauma to the foot  He does stand for long periods of time as he works 2 jobs  No previous history of pain in this area  Patient is not diabetic  Patient states he does use proper footwear while working at his two jobs  No previous or chills  No foreign bodies  No lacerations  Prior to Admission Medications   Prescriptions Last Dose Informant Patient Reported? Taking?    Ibuprofen (MOTRIN PO)   Yes No   Sig: Take by mouth   Patient not taking: Reported on 2021   Naproxen Sodium (ALEVE PO)   Yes No   Sig: Take by mouth   Patient not taking: Reported on 2021   citalopram (CeleXA) 10 mg tablet Not Taking at Unknown time  Yes No   Sig: Take 10 mg by mouth daily    Patient not taking: Reported on 2022    clonazePAM (KlonoPIN) 1 mg tablet   Yes No   Sig: Take 1 mg by mouth daily at bedtime   Patient not taking: Reported on 2021   fluticasone (FLONASE) 50 mcg/act nasal spray   No No   Si spray into each nostril daily   Patient not taking: Reported on 2021   ibuprofen (MOTRIN) 800 mg tablet   No No   Sig: Take 1 tablet (800 mg total) by mouth every 8 (eight) hours as needed for mild pain or moderate pain for up to 10 days   Patient not taking: Reported on 11/3/2021   ketorolac (TORADOL) 10 mg tablet   No No   Sig: Take 1 tablet (10 mg total) by mouth every 6 (six) hours as needed for severe pain   Patient not taking: Reported on 2020   naproxen (NAPROSYN) 500 mg tablet   No No   Sig: Take 1 tablet (500 mg total) by mouth 2 (two) times a day with meals   Patient not taking: Reported on 2/3/2020   ondansetron (ZOFRAN) 4 mg tablet   No No   Sig: Take 1 tablet (4 mg total) by mouth every 8 (eight) hours as needed for nausea or vomiting   Patient not taking: Reported on 2/3/2020   ondansetron (ZOFRAN-ODT) 4 mg disintegrating tablet   No No   Sig: Take 1 tablet (4 mg total) by mouth every 6 (six) hours as needed for nausea or vomiting   Patient not taking: Reported on 2020   ondansetron (ZOFRAN-ODT) 4 mg disintegrating tablet   No No   Sig: Take 1 tablet (4 mg total) by mouth every 8 (eight) hours as needed for nausea or vomiting for up to 20 doses   Patient not taking: Reported on 11/3/2021   ondansetron (ZOFRAN-ODT) 8 mg disintegrating tablet   No No   Sig: Take 1 tablet (8 mg total) by mouth every 8 (eight) hours as needed for nausea or vomiting   Patient not taking: Reported on 2020   tamsulosin (FLOMAX) 0 4 mg   No No   Sig: Take 1 capsule (0 4 mg total) by mouth daily   Patient not taking: Reported on 2020      Facility-Administered Medications: None       Past Medical History:   Diagnosis Date    Anxiety     Depression     Knee pain, right     Obesity        Past Surgical History:   Procedure Laterality Date    ARTHROSCOPY KNEE Right 2019    Procedure: ARTHROSCOPY KNEE WITH MPFL REPAIR;  Surgeon: Arley Grossman MD;  Location: [address] [place] [address];  Service: Orthopedics    CYST REMOVAL      buttocks        Family History   Problem Relation Age of Onset    No Known Problems Father      I have reviewed and agree with the history as documented      E-Cigarette/Vaping    E-Cigarette Use Never User      E-Cigarette/Vaping Substances    Nicotine No     THC No     CBD No     Flavoring No     Other No     Unknown No      Social History     Tobacco Use    Smoking status: Former Smoker     Packs/day: 0 20     Quit date: 2019     Years since quittin 3    Smokeless tobacco: Former User     Quit date: 2019   Vaping Use    Vaping Use: Never used   Substance Use Topics    Alcohol use: No    Drug use: No       Review of Systems   Constitutional: Negative for activity change, appetite change, chills and fever  HENT: Negative for ear pain, hearing loss, rhinorrhea, sneezing, sore throat and trouble swallowing  Eyes: Negative for pain and visual disturbance  Respiratory: Negative for cough, choking, chest tightness, shortness of breath, wheezing and stridor  Cardiovascular: Negative for chest pain, palpitations and leg swelling  Gastrointestinal: Negative for abdominal pain, constipation, diarrhea, nausea and vomiting  Genitourinary: Negative for difficulty urinating, dysuria, frequency, hematuria and testicular pain  Musculoskeletal: Negative for arthralgias, back pain, gait problem and neck pain  Skin: Negative for color change and rash  Allergic/Immunologic: Negative for immunocompromised state  Neurological: Negative for dizziness, seizures, syncope, speech difficulty, weakness, light-headedness, numbness and headaches  Psychiatric/Behavioral: Negative for behavioral problems  The patient is not nervous/anxious  All other systems reviewed and are negative  Physical Exam  Physical Exam  Vitals and nursing note reviewed  Constitutional:       General: He is not in acute distress  Appearance: He is well-developed  He is obese  He is not ill-appearing, toxic-appearing or diaphoretic  HENT:      Head: Normocephalic and atraumatic  Nose: Nose normal       Mouth/Throat:      Mouth: Mucous membranes are moist       Pharynx: Oropharynx is clear  Eyes:      General: No scleral icterus  Extraocular Movements: Extraocular movements intact  Conjunctiva/sclera: Conjunctivae normal    Cardiovascular:      Rate and Rhythm: Normal rate and regular rhythm  Pulses: Normal pulses  Heart sounds: Normal heart sounds  No murmur heard  Pulmonary:      Effort: Pulmonary effort is normal  No respiratory distress  Breath sounds: Normal breath sounds  No wheezing or rales     Chest:      Chest wall: No tenderness  Abdominal:      General: Bowel sounds are normal  There is no distension  Palpations: Abdomen is soft  There is no mass  Tenderness: There is no abdominal tenderness  There is no right CVA tenderness, left CVA tenderness, guarding or rebound  Musculoskeletal:         General: Tenderness present  No deformity  Normal range of motion  Cervical back: Normal range of motion and neck supple  Right lower leg: No edema  Left lower leg: No edema  Legs:    Lymphadenopathy:      Cervical: No cervical adenopathy  Skin:     General: Skin is warm and dry  Capillary Refill: Capillary refill takes less than 2 seconds  Findings: No bruising, erythema, lesion or rash  Neurological:      General: No focal deficit present  Mental Status: He is alert and oriented to person, place, and time  Motor: No abnormal muscle tone  Psychiatric:         Mood and Affect: Mood normal          Behavior: Behavior normal          Vital Signs  ED Triage Vitals [01/18/22 1044]   Temperature Pulse Respirations Blood Pressure SpO2   98 7 °F (37 1 °C) 72 20 155/78 99 %      Temp Source Heart Rate Source Patient Position - Orthostatic VS BP Location FiO2 (%)   Temporal Monitor -- -- --      Pain Score       8           Vitals:    01/18/22 1044   BP: 155/78   Pulse: 72         Visual Acuity  Visual Acuity      Most Recent Value   L Pupil Size (mm) 3   R Pupil Size (mm) 3          ED Medications  Medications - No data to display    Diagnostic Studies  Results Reviewed     None                 XR foot 3+ views LEFT    (Results Pending)           Wet read:  No fracture or dislocation    Procedures  Procedures         ED Course                                             MDM    Disposition  Final diagnoses:    Foot sprain, left, initial encounter     Time reflects when diagnosis was documented in both MDM as applicable and the Disposition within this note     Time User Action Codes Description Comment    1/18/2022 12:06 PM Oz Mitchell Add [[id number]] Foot sprain, left, initial encounter       ED Disposition     ED Disposition Condition Date/Time Comment    Discharge Stable Tue Jan 18, 2022 12:05 PM Jesusita Patton discharge to home/self care  Follow-up Information     Follow up With Specialties Details Why Contact Info    Isela Orantes MD Pediatrics Schedule an appointment as soon as possible for a visit   [place] [address]  [address]  [address]  [phone]            Patient's Medications   Discharge Prescriptions    IBUPROFEN (MOTRIN) 600 MG TABLET    Take 1 tablet (600 mg total) by mouth every 6 (six) hours as needed for moderate pain       Start Date: 1/18/2022 End Date: --       Order Dose: 600 mg       Quantity: 30 tablet    Refills: 0       No discharge procedures on file      PDMP Review     None          ED Provider  Electronically Signed by           Rober Kaiser DO  01/18/22 1980

## 2022-01-18 NOTE — Clinical Note
Alberto Estrella was seen and treated in our emergency department on 1/18/2022  Diagnosis:     Moira Lesches    He may return on this date:     Please allow Moira Lesches to sit as much as possible secondary to left foot injury  If you have any questions or concerns, please don't hesitate to call        Waqas Lara, DO    ______________________________           _______________          _______________  Hospital Representative                              Date                                Time

## 2022-01-18 NOTE — Clinical Note
Wilfrid Henning was seen and treated in our emergency department on 1/18/2022  Diagnosis:     Griselda Birchwood    He may return on this date:     Please allow Griselda Birchwood to sit as much as possible secondary to left foot injury  If you have any questions or concerns, please don't hesitate to call        Amado Robertson, DO    ______________________________           _______________          _______________  Hospital Representative                              Date                                Time

## 2022-01-28 ENCOUNTER — PATIENT OUTREACH (OUTPATIENT)
Dept: CASE MANAGEMENT | Facility: HOSPITAL | Age: 22
End: 2022-01-28

## 2022-02-04 ENCOUNTER — PATIENT OUTREACH (OUTPATIENT)
Dept: CASE MANAGEMENT | Facility: HOSPITAL | Age: 22
End: 2022-02-04

## 2022-02-04 NOTE — PROGRESS NOTES
Out Patient Care Management Note:  Patient has not returned the Social Determinants of Health Questionnaire sent via [address] on 01/28/22  ED followup episode will be closed at this time

## 2022-02-07 ENCOUNTER — HOSPITAL ENCOUNTER (EMERGENCY)
Facility: HOSPITAL | Age: 22
Discharge: HOME | End: 2022-02-07
Attending: EMERGENCY MEDICINE
Payer: COMMERCIAL

## 2022-02-07 VITALS
HEART RATE: 80 BPM | SYSTOLIC BLOOD PRESSURE: 135 MMHG | OXYGEN SATURATION: 99 % | RESPIRATION RATE: 20 BRPM | TEMPERATURE: 99.1 F | DIASTOLIC BLOOD PRESSURE: 89 MMHG

## 2022-02-07 DIAGNOSIS — L05.01 PILONIDAL CYST WITH ABSCESS: Primary | ICD-10-CM

## 2022-02-07 PROCEDURE — 10080 I&D PILONIDAL CYST SIMPLE: CPT | Performed by: EMERGENCY MEDICINE

## 2022-02-07 PROCEDURE — 99282 EMERGENCY DEPT VISIT SF MDM: CPT

## 2022-02-07 PROCEDURE — 99282 EMERGENCY DEPT VISIT SF MDM: CPT | Performed by: EMERGENCY MEDICINE

## 2022-02-07 RX ORDER — LIDOCAINE HYDROCHLORIDE AND EPINEPHRINE 10; 10 MG/ML; UG/ML
5 INJECTION, SOLUTION INFILTRATION; PERINEURAL ONCE
Status: DISCONTINUED | OUTPATIENT
Start: 2022-02-07 | End: 2022-02-07 | Stop reason: HOSPADM

## 2022-02-07 NOTE — Clinical Note
Ana Rosa Laurent was seen and treated in our emergency department on 2/7/2022  Diagnosis:     Jose Peralta  may return to work on return date  He may return on this date: 02/09/2022         If you have any questions or concerns, please don't hesitate to call        Pilo Marin MD    ______________________________           _______________          _______________  Hospital Representative                              Date                                Time

## 2022-02-08 NOTE — ED PROVIDER NOTES
Final Diagnosis:  1  Pilonidal cyst with abscess        Chief Complaint   Patient presents with    Cyst     pt states pilonidal cyst to buttocks came back noticed two days ago  HPI  Patient presents for evaluation of pilonidal cyst   Patient has a history of cyst as well as multiple abscesses  He states that over the last 2 days he has been feeling increased pressure and pain in his left gluteal cleft  He states this is consistent with his prior abscesses  He is here to have it drained  Patient denies any fever chills  No other complaints  Unless otherwise specified:  - No language barrier    - History obtained from patient  - There are no limitations to the history obtained  - Previous charting was reviewed    PMH:   has a past medical history of Anxiety, Depression, Knee pain, right, and Obesity  PSH:   has a past surgical history that includes Cyst Removal and ARTHROSCOPY KNEE (Right, 11/14/2019)  ROS:  Review of Systems   -   - 13 point ROS was performed and all are normal unless stated in the history above  - Nursing note reviewed  Vitals reviewed  - Orders placed by myself and/or advanced practitioner / resident  PE:   Vitals:    02/07/22 1950 02/07/22 2126   BP: (!) 135/106 135/89   BP Location: Left arm Left arm   Pulse: 80 80   Resp: 20 20   Temp: 99 1 °F (37 3 °C) 99 1 °F (37 3 °C)   SpO2: 99%      Vitals reviewed by me  Patient with area of firmness in the superior left gluteal cleft  On ultrasound there appears to be approximately a 0 5 cm fluid-filled area for drainage  No overlying erythema  This does not appear to track anywhere  Unless otherwise specified above:    General: VS reviewed  Appears in NAD    Head: Normocephalic, atraumatic  Eyes: EOM-I  No exudate  ENT: Atraumatic external nose and ears  No malocclusion  No stridor  No drooling  Neck: No JVD      CV: No pallor noted  Lungs:   No tachypnea  No respiratory distress    Abdomen:  Soft, non-tender, non-distended    MSK:   FROM spontaneously  No obvious deformity    Skin: Dry, intact  No obvious rash  Neuro: Awake, alert, GCS15, CN II-XII grossly intact  Speaking in full sentences  Motor grossly intact  Psychiatric/Behavioral: Appropriate mood and affect   Exam: deferred    Physical Exam     Incision and drain    Date/Time: 2/7/2022 8:42 PM  Performed by: Winnie Vidal MD  Authorized by: Winnie Vidal MD   Universal Protocol:  Consent: Verbal consent obtained  Consent given by: patient  Patient identity confirmed: verbally with patient      Patient location:  ED  Location:     Type:  Pilonidal cyst    Location:  Anogenital    Anogenital location:  Pilonidal  Pre-procedure details:     Skin preparation:  Chloraprep  Procedure details:     Complexity:  Simple    Needle aspiration: no      Incision types:  Stab incision    Scalpel blade:  11    Approach:  Open    Incision depth:  Subcutaneous    Wound management:  Probed and deloculated    Drainage:  Bloody and purulent    Drainage amount: Moderate    Wound treatment:  Wound left open    Packing materials:  None  Post-procedure details:     Patient tolerance of procedure: Tolerated well, no immediate complications       A:  - Nursing note reviewed  No orders to display     Orders Placed This Encounter   Procedures    Incision and Drainage     Labs Reviewed - No data to display      Final Diagnosis:  1  Pilonidal cyst with abscess        P:  - area was drained bedside  Repeat ultrasound showed only very small amount of fluid left in the area  I discussed with the patient that he should follow up with General surgery as he has discussed in the past having the cyst removed  Return precautions discussed      Medications   lidocaine-epinephrine (XYLOCAINE/EPINEPHRINE) 1 %-1:100,000 injection 5 mL (has no administration in time range)     Time reflects when diagnosis was documented in both MDM as applicable and the Disposition within this note     Time User Action Codes Description Comment    2/7/2022  8:50 PM Jn Shelton Add [L05 01] Pilonidal cyst with abscess       ED Disposition     ED Disposition Condition Date/Time Comment    Discharge Stable Mon Feb 7, 2022  8:50 PM Ludmila Jiménez discharge to home/self care              Follow-up Information     Follow up With Specialties Details Why Contact Info Additional Information    [facility]   [address], [address] [place] [address]        Discharge Medication List as of 2/7/2022  8:50 PM      CONTINUE these medications which have NOT CHANGED    Details   citalopram (CeleXA) 10 mg tablet Take 10 mg by mouth daily , Historical Med      clonazePAM (KlonoPIN) 1 mg tablet Take 1 mg by mouth daily at bedtime, Historical Med      fluticasone (FLONASE) 50 mcg/act nasal spray 1 spray into each nostril daily, Starting Sun 7/11/2021, Normal      !! Ibuprofen (MOTRIN PO) Take by mouth, Historical Med      !! ibuprofen (MOTRIN) 600 mg tablet Take 1 tablet (600 mg total) by mouth every 6 (six) hours as needed for moderate pain, Starting Tue 1/18/2022, Normal      ketorolac (TORADOL) 10 mg tablet Take 1 tablet (10 mg total) by mouth every 6 (six) hours as needed for severe pain, Starting Tue 9/22/2020, Normal      naproxen (NAPROSYN) 500 mg tablet Take 1 tablet (500 mg total) by mouth 2 (two) times a day with meals, Starting Thu 11/14/2019, Normal      Naproxen Sodium (ALEVE PO) Take by mouth, Historical Med      ondansetron (ZOFRAN) 4 mg tablet Take 1 tablet (4 mg total) by mouth every 8 (eight) hours as needed for nausea or vomiting, Starting Thu 11/14/2019, Normal      !! ondansetron (ZOFRAN-ODT) 4 mg disintegrating tablet Take 1 tablet (4 mg total) by mouth every 6 (six) hours as needed for nausea or vomiting, Starting Tue 2020, Normal      !! ondansetron (ZOFRAN-ODT) 4 mg disintegrating tablet Take 1 tablet (4 mg total) by mouth every 8 (eight) hours as needed for nausea or vomiting for up to 20 doses, Starting Thu 2021, Normal      !! ondansetron (ZOFRAN-ODT) 8 mg disintegrating tablet Take 1 tablet (8 mg total) by mouth every 8 (eight) hours as needed for nausea or vomiting, Starting Mon 2/10/2020, Normal      tamsulosin (FLOMAX) 0 4 mg Take 1 capsule (0 4 mg total) by mouth daily, Starting Tue 2020, Normal       !! - Potential duplicate medications found  Please discuss with provider  No discharge procedures on file  Prior to Admission Medications   Prescriptions Last Dose Informant Patient Reported? Taking?    Ibuprofen (MOTRIN PO)   Yes No   Sig: Take by mouth   Patient not taking: Reported on 2021   Naproxen Sodium (ALEVE PO)   Yes No   Sig: Take by mouth   Patient not taking: Reported on 2021   citalopram (CeleXA) 10 mg tablet   Yes No   Sig: Take 10 mg by mouth daily    Patient not taking: Reported on 2022    clonazePAM (KlonoPIN) 1 mg tablet   Yes No   Sig: Take 1 mg by mouth daily at bedtime   Patient not taking: Reported on 2021   fluticasone (FLONASE) 50 mcg/act nasal spray   No No   Si spray into each nostril daily   Patient not taking: Reported on 2021   ibuprofen (MOTRIN) 600 mg tablet   No No   Sig: Take 1 tablet (600 mg total) by mouth every 6 (six) hours as needed for moderate pain   ibuprofen (MOTRIN) 800 mg tablet   No No   Sig: Take 1 tablet (800 mg total) by mouth every 8 (eight) hours as needed for mild pain or moderate pain for up to 10 days   Patient not taking: Reported on 11/3/2021   ketorolac (TORADOL) 10 mg tablet   No No   Sig: Take 1 tablet (10 mg total) by mouth every 6 (six) hours as needed for severe pain   Patient not taking: Reported on 2020   naproxen (NAPROSYN) 500 mg tablet   No No   Sig: Take 1 tablet (500 mg total) by mouth 2 (two) times a day with meals   Patient not taking: Reported on 2/3/2020   ondansetron (ZOFRAN) 4 mg tablet   No No   Sig: Take 1 tablet (4 mg total) by mouth every 8 (eight) hours as needed for nausea or vomiting   Patient not taking: Reported on 2/3/2020   ondansetron (ZOFRAN-ODT) 4 mg disintegrating tablet   No No   Sig: Take 1 tablet (4 mg total) by mouth every 6 (six) hours as needed for nausea or vomiting   Patient not taking: Reported on 12/18/2020   ondansetron (ZOFRAN-ODT) 4 mg disintegrating tablet   No No   Sig: Take 1 tablet (4 mg total) by mouth every 8 (eight) hours as needed for nausea or vomiting for up to 20 doses   Patient not taking: Reported on 11/3/2021   ondansetron (ZOFRAN-ODT) 8 mg disintegrating tablet   No No   Sig: Take 1 tablet (8 mg total) by mouth every 8 (eight) hours as needed for nausea or vomiting   Patient not taking: Reported on 9/17/2020   tamsulosin (FLOMAX) 0 4 mg   No No   Sig: Take 1 capsule (0 4 mg total) by mouth daily   Patient not taking: Reported on 12/18/2020      Facility-Administered Medications: None       Portions of the record may have been created with voice recognition software  Occasional wrong word or "sound a like" substitutions may have occurred due to the inherent limitations of voice recognition software  Read the chart carefully and recognize, using context, where substitutions have occurred      Electronically signed by:  MD Jose Angel Morley MD  02/07/22 [id number]

## 2022-02-16 ENCOUNTER — PATIENT OUTREACH (OUTPATIENT)
Dept: CASE MANAGEMENT | Facility: HOSPITAL | Age: 22
End: 2022-02-16

## 2022-02-16 NOTE — PROGRESS NOTES
Outpatient Care Management Note:  Patient was identified via report as having a recent non urgent and non life threatening ED visit at [address] [place] [address]  Chart reviewed  Patient was in the ED on 02/07/22 for evaluation of pilonidal cyst   Patient has also had an ED visit in January for a foot sprain  Per chart review it appears patient does not have primary care provider  Telephone outreach made to introduce self and role of care management, follow up on general health, outreach for any possible medical or psychosocial services needed and assess for COVID-19 vaccine confidence  Spoke with patient  Patient is agreeable to one time outreach  ED Follow-up Assessment and SDOH Questionnaire Completed    1  Do you have an appointment with your primary care provider? Does not have a PCP  2    Do you have an appointment with a specialist for follow-up?  Name of provider: Beth Cruz Date and Time of Appointment: does not have appoitnment   Name of provider: Ortho   Date and Time of Appointment: Does not have appointment   Name of provider: General Surgery   Date and Time of Appointment: Does not have appt  3  Do you have a way to get to your appointments? yes    Health Status:    1  Did you get an after visit summary? yes  2  Did you (or caregiver) read the summary? yes  3  Did you understand it? yes (Teachback Tool)  4  Do you have any questions about your plan of care? no  5  Do you have any new or worsening symptoms? no    Do you have a lingering or new fever? no   Is the cyst still draining? Yes, it's still open and there is drainage  I'm applying dry dressings  6  Do you know what to do if your symptoms worsen? 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

## 2022-02-23 ENCOUNTER — OPTICAL OFFICE (OUTPATIENT)
Dept: URBAN - NONMETROPOLITAN AREA CLINIC 5 | Facility: CLINIC | Age: 22
Setting detail: OPHTHALMOLOGY
End: 2022-02-23

## 2022-02-23 DIAGNOSIS — H52.13: ICD-10-CM

## 2022-02-23 PROCEDURE — S0500 DISPOS CONT LENS: HCPCS | Performed by: OPHTHALMOLOGY

## 2022-03-03 ENCOUNTER — HOSPITAL ENCOUNTER (EMERGENCY)
Facility: HOSPITAL | Age: 22
Discharge: HOME | End: 2022-03-03
Attending: EMERGENCY MEDICINE | Admitting: EMERGENCY MEDICINE
Payer: COMMERCIAL

## 2022-03-03 VITALS
OXYGEN SATURATION: 98 % | DIASTOLIC BLOOD PRESSURE: 96 MMHG | RESPIRATION RATE: 18 BRPM | HEART RATE: 104 BPM | TEMPERATURE: 100.6 F | SYSTOLIC BLOOD PRESSURE: 140 MMHG

## 2022-03-03 DIAGNOSIS — L02.31 ABSCESS OF RIGHT BUTTOCK: Primary | ICD-10-CM

## 2022-03-03 PROCEDURE — 99283 EMERGENCY DEPT VISIT LOW MDM: CPT

## 2022-03-03 PROCEDURE — 10061 I&D ABSCESS COMP/MULTIPLE: CPT | Performed by: EMERGENCY MEDICINE

## 2022-03-03 PROCEDURE — 99284 EMERGENCY DEPT VISIT MOD MDM: CPT | Performed by: EMERGENCY MEDICINE

## 2022-03-03 PROCEDURE — 87070 CULTURE OTHR SPECIMN AEROBIC: CPT | Performed by: EMERGENCY MEDICINE

## 2022-03-03 PROCEDURE — 87077 CULTURE AEROBIC IDENTIFY: CPT | Performed by: EMERGENCY MEDICINE

## 2022-03-03 PROCEDURE — 87205 SMEAR GRAM STAIN: CPT | Performed by: EMERGENCY MEDICINE

## 2022-03-03 RX ORDER — DOXYCYCLINE HYCLATE 100 MG/1
100 CAPSULE ORAL ONCE
Status: COMPLETED | OUTPATIENT
Start: 2022-03-03 | End: 2022-03-03

## 2022-03-03 RX ORDER — DOXYCYCLINE HYCLATE 100 MG/1
100 CAPSULE ORAL 2 TIMES DAILY
Qty: 14 CAPSULE | Refills: 0 | Status: SHIPPED | OUTPATIENT
Start: 2022-03-03 | End: 2022-03-10

## 2022-03-03 RX ADMIN — DOXYCYCLINE HYCLATE 100 MG: 100 CAPSULE ORAL at 17:32

## 2022-03-03 NOTE — DISCHARGE INSTRUCTIONS
Allow abscess to drain  Finished 1 week of doxycycline  Do not take with calcium magnesium or multivitamin dairy; weight 2 hours before consuming those substances as it will stick to the antibiotic and make it ineffective    Sitz baths  Return with worsening chills fever feeling as if have the flu worsening pain increasing redness hardness of the buttock any abdominal pain any difficulties with having a bowel movement or any concern

## 2022-03-03 NOTE — ED PROVIDER NOTES
History  Chief Complaint   Patient presents with    Abscess     pt states he has an abcess on right buttocks for several days  pt states it is painful when walking     25-year-old male with history of recurrent abscesses as underwent an I& D  in the emergency department on 2022; he states that his pilonidal cyst wound is completely healed up  Over last 2-3 days he has developed increasing pain in abscess to his right buttock region it is high up it is painful to walk it drained a little bit  He has felt a little feverish but he has had no chills he is otherwise tolerating a diet he denies any difficulties with bowel movements he has had no difficulties with urination no abdominal pain no nausea vomiting or diarrhea no dysuria or increased urinary frequency  Denies any testicular masses redness no difficulties with urination no rash to the  region  Patient's last tetanus was 2014; he denies any prior history of MRSA  Prior to Admission Medications   Prescriptions Last Dose Informant Patient Reported? Taking?    Ibuprofen (MOTRIN PO)   Yes No   Sig: Take by mouth   Patient not taking: Reported on 2021   Naproxen Sodium (ALEVE PO)   Yes No   Sig: Take by mouth   Patient not taking: Reported on 2021   citalopram (CeleXA) 10 mg tablet   Yes No   Sig: Take 10 mg by mouth daily    Patient not taking: Reported on 2022    clonazePAM (KlonoPIN) 1 mg tablet   Yes No   Sig: Take 1 mg by mouth daily at bedtime   Patient not taking: Reported on 2021   fluticasone (FLONASE) 50 mcg/act nasal spray   No No   Si spray into each nostril daily   Patient not taking: Reported on 2021   ibuprofen (MOTRIN) 600 mg tablet Not Taking at Unknown time  No No   Sig: Take 1 tablet (600 mg total) by mouth every 6 (six) hours as needed for moderate pain   Patient not taking: Reported on 3/3/2022    ibuprofen (MOTRIN) 800 mg tablet   No No   Sig: Take 1 tablet (800 mg total) by mouth every 8 (eight) hours as needed for mild pain or moderate pain for up to 10 days   Patient not taking: Reported on 11/3/2021   ketorolac (TORADOL) 10 mg tablet   No No   Sig: Take 1 tablet (10 mg total) by mouth every 6 (six) hours as needed for severe pain   Patient not taking: Reported on 12/18/2020   naproxen (NAPROSYN) 500 mg tablet   No No   Sig: Take 1 tablet (500 mg total) by mouth 2 (two) times a day with meals   Patient not taking: Reported on 2/3/2020   ondansetron (ZOFRAN) 4 mg tablet   No No   Sig: Take 1 tablet (4 mg total) by mouth every 8 (eight) hours as needed for nausea or vomiting   Patient not taking: Reported on 2/3/2020   ondansetron (ZOFRAN-ODT) 4 mg disintegrating tablet   No No   Sig: Take 1 tablet (4 mg total) by mouth every 6 (six) hours as needed for nausea or vomiting   Patient not taking: Reported on 12/18/2020   ondansetron (ZOFRAN-ODT) 4 mg disintegrating tablet   No No   Sig: Take 1 tablet (4 mg total) by mouth every 8 (eight) hours as needed for nausea or vomiting for up to 20 doses   Patient not taking: Reported on 11/3/2021   ondansetron (ZOFRAN-ODT) 8 mg disintegrating tablet   No No   Sig: Take 1 tablet (8 mg total) by mouth every 8 (eight) hours as needed for nausea or vomiting   Patient not taking: Reported on 9/17/2020   tamsulosin (FLOMAX) 0 4 mg   No No   Sig: Take 1 capsule (0 4 mg total) by mouth daily   Patient not taking: Reported on 12/18/2020      Facility-Administered Medications: None       Past Medical History:   Diagnosis Date    Anxiety     Depression     Knee pain, right     Obesity        Past Surgical History:   Procedure Laterality Date    ARTHROSCOPY KNEE Right 11/14/2019    Procedure: ARTHROSCOPY KNEE WITH MPFL REPAIR;  Surgeon: Abdulkadir Sprague MD;  Location: [address] [place] [address];  Service: Orthopedics    CYST REMOVAL      buttocks        Family History   Problem Relation Age of Onset    No Known Problems Father      I have reviewed and agree with the history as documented  E-Cigarette/Vaping    E-Cigarette Use Never User      E-Cigarette/Vaping Substances    Nicotine No     THC No     CBD No     Flavoring No     Other No     Unknown No      Social History     Tobacco Use    Smoking status: Former Smoker     Packs/day: 0 20     Quit date: 2019     Years since quittin 5    Smokeless tobacco: Former User     Quit date: 2019   Vaping Use    Vaping Use: Never used   Substance Use Topics    Alcohol use: No    Drug use: No       Review of Systems   Constitutional: Positive for fever  Negative for activity change, appetite change, chills and diaphoresis  HENT: Negative for congestion, ear pain, rhinorrhea, sneezing and sore throat  Eyes: Negative for discharge  Respiratory: Negative for cough and shortness of breath  Cardiovascular: Negative for chest pain and leg swelling  Gastrointestinal: Negative for abdominal pain, blood in stool, diarrhea, nausea and vomiting  Endocrine: Negative for polyuria  Genitourinary: Negative for difficulty urinating, dysuria, frequency, scrotal swelling, testicular pain and urgency  Musculoskeletal: Negative for back pain and myalgias  Skin: Positive for wound (rt buttock)  Negative for rash  Neurological: Negative for dizziness, weakness and numbness  Hematological: Negative for adenopathy  Psychiatric/Behavioral: Negative for confusion  All other systems reviewed and are negative  Physical Exam  Physical Exam  Vitals and nursing note reviewed  Constitutional:       General: He is not in acute distress  Appearance: He is well-developed  He is not ill-appearing, toxic-appearing or diaphoretic  HENT:      Head: Normocephalic and atraumatic        Right Ear: Tympanic membrane and external ear normal       Left Ear: Tympanic membrane and external ear normal       Nose: Nose normal       Mouth/Throat:      Mouth: Mucous membranes are moist       Pharynx: No oropharyngeal exudate or posterior oropharyngeal erythema  Eyes:      General: No scleral icterus  Right eye: No discharge  Left eye: No discharge  Conjunctiva/sclera: Conjunctivae normal       Pupils: Pupils are equal, round, and reactive to light  Cardiovascular:      Rate and Rhythm: Normal rate and regular rhythm  Heart sounds: Normal heart sounds  Comments: Recheck HR  95  Pulmonary:      Effort: Pulmonary effort is normal  No respiratory distress  Breath sounds: Normal breath sounds  No stridor  No wheezing, rhonchi or rales  Abdominal:      General: Bowel sounds are normal  There is no distension  Palpations: Abdomen is soft  There is no mass  Tenderness: There is no abdominal tenderness  There is no guarding or rebound  Comments: Back: no mildline or CVA tenderness   Genitourinary:     Comments:  exam deferred  Patient has a localized area of increased erythema induration and some fluctuance to the right buttock just lateral to the sacral region  Perianal region was examined is soft nontender there is no induration or erythema surrounding that is involving the dimple area where his pilonidal cyst was the incision for the I&D of the pilonidal cyst is clean dry and intact  This area is laterally early and inferior PE  Please see media tab  Musculoskeletal:         General: No tenderness or deformity  Normal range of motion  Cervical back: Normal range of motion and neck supple  Lymphadenopathy:      Cervical: No cervical adenopathy  Skin:     General: Skin is warm and dry  Neurological:      Mental Status: He is alert and oriented to person, place, and time  Cranial Nerves: No cranial nerve deficit  Motor: No abnormal muscle tone        Deep Tendon Reflexes: Reflexes normal       Comments: Gait steady         Vital Signs  ED Triage Vitals   Temperature Pulse Respirations Blood Pressure SpO2   03/03/22 1714 03/03/22 1627 03/03/22 1627 03/03/22 1627 03/03/22 1627   (!) 100 6 °F (38 1 °C) 104 18 140/96 98 %      Temp Source Heart Rate Source Patient Position - Orthostatic VS BP Location FiO2 (%)   03/03/22 1714 03/03/22 1627 03/03/22 1627 03/03/22 1627 --   Temporal Monitor Sitting Left arm       Pain Score       03/03/22 1627       No Pain           Vitals:    03/03/22 1627   BP: 140/96   Pulse: 104   Patient Position - Orthostatic VS: Sitting         Visual Acuity      ED Medications  Medications   doxycycline hyclate (VIBRAMYCIN) capsule 100 mg (100 mg Oral Given 3/3/22 1732)       Diagnostic Studies  Results Reviewed     Procedure Component Value Units Date/Time    Wound culture and Gram stain [[id number]] Collected: 03/03/22 1733    Lab Status: In process Specimen: Wound from Buttock Updated: 03/03/22 1736                 No orders to display              Procedures  Incision and drain    Date/Time: 3/3/2022 5:20 PM  Performed by: Marie Bey MD  Authorized by: Marie Bey MD   Universal Protocol:  Consent: Verbal consent obtained  Risks and benefits: risks, benefits and alternatives were discussed  Consent given by: patient  Patient understanding: patient states understanding of the procedure being performed  Patient identity confirmed: verbally with patient and arm band      Patient location:  ED  Location:     Type:  Abscess    Location:  Anogenital    Anogenital location:  Gluteal cleft (rt superior)  Pre-procedure details:     Skin preparation:  Chloraprep  Anesthesia (see MAR for exact dosages): Anesthesia method:  Local infiltration    Local anesthetic:  Lidocaine 1% WITH epi (5ml)  Procedure details:     Complexity:  Simple    Incision types:  Stab incision    Scalpel blade:  11    Approach:  Open    Incision depth:  Subcutaneous    Wound management:  Probed and deloculated and irrigated with saline    Irrigation with saline:  50 ml     Drainage:  Bloody and purulent    Drainage amount:   Moderate    Wound treatment:  Wound left open    Packing materials:  None  Post-procedure details:     Patient tolerance of procedure: Tolerated well, no immediate complications             ED Course  ED Course as of 03/04/22 0057   u Mar 03, 2022   1714 Declines update of his tetanus                                             MDM  Number of Diagnoses or Management Options  Abscess of right buttock  Diagnosis management comments: Mdm:  Abscess versus cellulitis  Does not appear to be affecting the perirectal region  Point of care ultrasound was used he is demonstrating a pocket of fluid approximately 1 cm at the area of increased erythema patient agreeable to proceeding with I&D  Disposition  Final diagnoses:   Abscess of right buttock - s/p I & D     Time reflects when diagnosis was documented in both MDM as applicable and the Disposition within this note     Time User Action Codes Description Comment    3/3/2022  5:16 PM Elaine Barrera Add [L02 31] Abscess of right buttock     3/3/2022  5:16 PM Elaine Barrera Modify [L02 31] Abscess of right buttock s/p I & D      ED Disposition     ED Disposition Condition Date/Time Comment    Discharge Stable u Mar 3, 2022  5:16 PM Angela Moncada discharge to home/self care              Follow-up Information     Follow up With Specialties Details Why Contact Info Additional Information    Alisha Andrew,  General Surgery, Wound Care Call in 1 day recheck of abscess [address] [facility] [phone]       [facility] Emergency Department Emergency Medicine Go to  If symptoms worsen increasing pain redness feeling as if the have the flu worsening hardness to the right buttock spreading discomfort [phone]  [facility] Emergency Department, [address], [address]          Discharge Medication List as of 3/3/2022  5:24 PM      START taking these medications    Details   doxycycline hyclate (VIBRAMYCIN) 100 mg capsule Take 1 capsule (100 mg total) by mouth 2 (two) times a day for 7 days, Starting Thu 3/3/2022, Until Thu 3/10/2022, Normal         CONTINUE these medications which have NOT CHANGED    Details   citalopram (CeleXA) 10 mg tablet Take 10 mg by mouth daily , Historical Med      clonazePAM (KlonoPIN) 1 mg tablet Take 1 mg by mouth daily at bedtime, Historical Med      fluticasone (FLONASE) 50 mcg/act nasal spray 1 spray into each nostril daily, Starting Sun 7/11/2021, Normal      !! Ibuprofen (MOTRIN PO) Take by mouth, Historical Med      !! ibuprofen (MOTRIN) 600 mg tablet Take 1 tablet (600 mg total) by mouth every 6 (six) hours as needed for moderate pain, Starting Tue 1/18/2022, Normal      ketorolac (TORADOL) 10 mg tablet Take 1 tablet (10 mg total) by mouth every 6 (six) hours as needed for severe pain, Starting Tue 9/22/2020, Normal      naproxen (NAPROSYN) 500 mg tablet Take 1 tablet (500 mg total) by mouth 2 (two) times a day with meals, Starting Thu 11/14/2019, Normal      Naproxen Sodium (ALEVE PO) Take by mouth, Historical Med      ondansetron (ZOFRAN) 4 mg tablet Take 1 tablet (4 mg total) by mouth every 8 (eight) hours as needed for nausea or vomiting, Starting Thu 11/14/2019, Normal      !! ondansetron (ZOFRAN-ODT) 4 mg disintegrating tablet Take 1 tablet (4 mg total) by mouth every 6 (six) hours as needed for nausea or vomiting, Starting Tue 9/22/2020, Normal      !! ondansetron (ZOFRAN-ODT) 4 mg disintegrating tablet Take 1 tablet (4 mg total) by mouth every 8 (eight) hours as needed for nausea or vomiting for up to 20 doses, Starting Thu 9/23/2021, Normal      !! ondansetron (ZOFRAN-ODT) 8 mg disintegrating tablet Take 1 tablet (8 mg total) by mouth every 8 (eight) hours as needed for nausea or vomiting, Starting Mon 2/10/2020, Normal      tamsulosin (FLOMAX) 0 4 mg Take 1 capsule (0 4 mg total) by mouth daily, Starting Tue 9/22/2020, Normal       !! - Potential duplicate medications found  Please discuss with provider  No discharge procedures on file      PDMP Review     None          ED Provider  Electronically Signed by           Derrick Huynh MD  03/04/22 2590

## 2022-03-07 LAB
BACTERIA WND AEROBE CULT: ABNORMAL
GRAM STN SPEC: ABNORMAL

## 2022-06-06 ENCOUNTER — HOSPITAL ENCOUNTER (EMERGENCY)
Facility: HOSPITAL | Age: 22
Discharge: HOME | End: 2022-06-06
Attending: EMERGENCY MEDICINE
Payer: COMMERCIAL

## 2022-06-06 ENCOUNTER — APPOINTMENT (EMERGENCY)
Dept: CT IMAGING | Facility: HOSPITAL | Age: 22
End: 2022-06-06
Payer: COMMERCIAL

## 2022-06-06 VITALS
BODY MASS INDEX: 46.65 KG/M2 | OXYGEN SATURATION: 92 % | TEMPERATURE: 97.8 F | HEIGHT: 69 IN | RESPIRATION RATE: 16 BRPM | DIASTOLIC BLOOD PRESSURE: 76 MMHG | SYSTOLIC BLOOD PRESSURE: 131 MMHG | HEART RATE: 78 BPM | WEIGHT: 315 LBS

## 2022-06-06 DIAGNOSIS — N20.0 KIDNEY STONE ON LEFT SIDE: Primary | ICD-10-CM

## 2022-06-06 DIAGNOSIS — S93.602A FOOT SPRAIN, LEFT, INITIAL ENCOUNTER: ICD-10-CM

## 2022-06-06 DIAGNOSIS — R10.9 RIGHT FLANK PAIN: ICD-10-CM

## 2022-06-06 DIAGNOSIS — S83.004D PATELLAR DISLOCATION, RIGHT, SUBSEQUENT ENCOUNTER: ICD-10-CM

## 2022-06-06 DIAGNOSIS — N20.0 NEPHROLITHIASIS: ICD-10-CM

## 2022-06-06 LAB
ANION GAP SERPL CALCULATED.3IONS-SCNC: 7 MMOL/L (ref 4–13)
BACTERIA UR QL AUTO: ABNORMAL /HPF
BASOPHILS # BLD AUTO: 0.07 THOUSANDS/ΜL (ref 0–0.1)
BASOPHILS NFR BLD AUTO: 1 % (ref 0–1)
BILIRUB UR QL STRIP: NEGATIVE
BUN SERPL-MCNC: 16 MG/DL (ref 5–25)
CALCIUM SERPL-MCNC: 9.5 MG/DL (ref 8.3–10.1)
CHLORIDE SERPL-SCNC: 99 MMOL/L (ref 100–108)
CLARITY UR: ABNORMAL
CO2 SERPL-SCNC: 28 MMOL/L (ref 21–32)
COLOR UR: YELLOW
CREAT SERPL-MCNC: 1.17 MG/DL (ref 0.6–1.3)
EOSINOPHIL # BLD AUTO: 0.1 THOUSAND/ΜL (ref 0–0.61)
EOSINOPHIL NFR BLD AUTO: 1 % (ref 0–6)
ERYTHROCYTE [DISTWIDTH] IN BLOOD BY AUTOMATED COUNT: 12.9 % (ref 11.6–15.1)
GFR SERPL CREATININE-BSD FRML MDRD: 88 ML/MIN/1.73SQ M
GLUCOSE SERPL-MCNC: 114 MG/DL (ref 65–140)
GLUCOSE UR STRIP-MCNC: NEGATIVE MG/DL
HCT VFR BLD AUTO: 39.8 % (ref 36.5–49.3)
HGB BLD-MCNC: 13.3 G/DL (ref 12–17)
HGB UR QL STRIP.AUTO: ABNORMAL
HYALINE CASTS #/AREA URNS LPF: ABNORMAL /LPF
IMM GRANULOCYTES # BLD AUTO: 0.05 THOUSAND/UL (ref 0–0.2)
IMM GRANULOCYTES NFR BLD AUTO: 1 % (ref 0–2)
KETONES UR STRIP-MCNC: NEGATIVE MG/DL
LEUKOCYTE ESTERASE UR QL STRIP: ABNORMAL
LYMPHOCYTES # BLD AUTO: 2.42 THOUSANDS/ΜL (ref 0.6–4.47)
LYMPHOCYTES NFR BLD AUTO: 23 % (ref 14–44)
MCH RBC QN AUTO: 28.4 PG (ref 26.8–34.3)
MCHC RBC AUTO-ENTMCNC: 33.4 G/DL (ref 31.4–37.4)
MCV RBC AUTO: 85 FL (ref 82–98)
MONOCYTES # BLD AUTO: 0.84 THOUSAND/ΜL (ref 0.17–1.22)
MONOCYTES NFR BLD AUTO: 8 % (ref 4–12)
MUCOUS THREADS UR QL AUTO: ABNORMAL
NEUTROPHILS # BLD AUTO: 7.13 THOUSANDS/ΜL (ref 1.85–7.62)
NEUTS SEG NFR BLD AUTO: 66 % (ref 43–75)
NITRITE UR QL STRIP: NEGATIVE
NON-SQ EPI CELLS URNS QL MICRO: ABNORMAL /HPF
NRBC BLD AUTO-RTO: 0 /100 WBCS
PH UR STRIP.AUTO: 5.5 [PH]
PLATELET # BLD AUTO: 324 THOUSANDS/UL (ref 149–390)
PMV BLD AUTO: 9.3 FL (ref 8.9–12.7)
POTASSIUM SERPL-SCNC: 3.8 MMOL/L (ref 3.5–5.3)
PROT UR STRIP-MCNC: ABNORMAL MG/DL
RBC # BLD AUTO: 4.68 MILLION/UL (ref 3.88–5.62)
RBC #/AREA URNS AUTO: ABNORMAL /HPF
SODIUM SERPL-SCNC: 134 MMOL/L (ref 136–145)
SP GR UR STRIP.AUTO: 1.02 (ref 1–1.03)
UROBILINOGEN UR QL STRIP.AUTO: 0.2 E.U./DL
WBC # BLD AUTO: 10.61 THOUSAND/UL (ref 4.31–10.16)
WBC #/AREA URNS AUTO: ABNORMAL /HPF

## 2022-06-06 PROCEDURE — 80048 BASIC METABOLIC PNL TOTAL CA: CPT | Performed by: EMERGENCY MEDICINE

## 2022-06-06 PROCEDURE — 36415 COLL VENOUS BLD VENIPUNCTURE: CPT | Performed by: EMERGENCY MEDICINE

## 2022-06-06 PROCEDURE — 74176 CT ABD & PELVIS W/O CONTRAST: CPT

## 2022-06-06 PROCEDURE — 96361 HYDRATE IV INFUSION ADD-ON: CPT

## 2022-06-06 PROCEDURE — G1004 CDSM NDSC: HCPCS

## 2022-06-06 PROCEDURE — 99284 EMERGENCY DEPT VISIT MOD MDM: CPT

## 2022-06-06 PROCEDURE — 96374 THER/PROPH/DIAG INJ IV PUSH: CPT

## 2022-06-06 PROCEDURE — 99284 EMERGENCY DEPT VISIT MOD MDM: CPT | Performed by: EMERGENCY MEDICINE

## 2022-06-06 PROCEDURE — 85025 COMPLETE CBC W/AUTO DIFF WBC: CPT | Performed by: EMERGENCY MEDICINE

## 2022-06-06 PROCEDURE — 96375 TX/PRO/DX INJ NEW DRUG ADDON: CPT

## 2022-06-06 PROCEDURE — 81001 URINALYSIS AUTO W/SCOPE: CPT | Performed by: EMERGENCY MEDICINE

## 2022-06-06 RX ORDER — IBUPROFEN 600 MG/1
600 TABLET ORAL EVERY 6 HOURS PRN
Qty: 30 TABLET | Refills: 0 | Status: SHIPPED | OUTPATIENT
Start: 2022-06-06

## 2022-06-06 RX ORDER — TAMSULOSIN HYDROCHLORIDE 0.4 MG/1
0.4 CAPSULE ORAL DAILY
Qty: 7 CAPSULE | Refills: 0 | Status: SHIPPED | OUTPATIENT
Start: 2022-06-06

## 2022-06-06 RX ORDER — KETOROLAC TROMETHAMINE 30 MG/ML
15 INJECTION, SOLUTION INTRAMUSCULAR; INTRAVENOUS ONCE
Status: COMPLETED | OUTPATIENT
Start: 2022-06-06 | End: 2022-06-06

## 2022-06-06 RX ORDER — ONDANSETRON 2 MG/ML
4 INJECTION INTRAMUSCULAR; INTRAVENOUS ONCE
Status: COMPLETED | OUTPATIENT
Start: 2022-06-06 | End: 2022-06-06

## 2022-06-06 RX ORDER — ONDANSETRON 4 MG/1
4 TABLET, ORALLY DISINTEGRATING ORAL EVERY 6 HOURS PRN
Qty: 10 TABLET | Refills: 0 | Status: SHIPPED | OUTPATIENT
Start: 2022-06-06

## 2022-06-06 RX ADMIN — ONDANSETRON 4 MG: 2 INJECTION INTRAMUSCULAR; INTRAVENOUS at 11:27

## 2022-06-06 RX ADMIN — KETOROLAC TROMETHAMINE 15 MG: 30 INJECTION, SOLUTION INTRAMUSCULAR at 10:26

## 2022-06-06 RX ADMIN — SODIUM CHLORIDE 1000 ML: 0.9 INJECTION, SOLUTION INTRAVENOUS at 10:26

## 2022-06-06 NOTE — DISCHARGE INSTRUCTIONS
Thank you for visiting the Emergency Department today  3 mm kidney stone in the proximal left ureter  Will need to travel to the ureter into the bladder prior to being past   Based on size and location should pass without intervention  We will resume Flomax, NSAIDs, encourage plenty of fluids  Contact urology as needed for follow-up  Return here for serious symptoms or symptoms worsen  Nausea medication also provided prescription sent to pharmacy

## 2022-06-06 NOTE — ED PROVIDER NOTES
History  Chief Complaint   Patient presents with    Flank Pain     Patient reports sudden left sided flank pain beginning last night  Patient reports nausea and vomiting  Passed 1 kidney stone last week and told 3-4 more left in there       Flank Pain  Pain location:  L flank  Pain quality: aching    Pain radiates to:  Does not radiate  Pain severity:  Moderate  Onset quality:  Sudden  Duration:  6 hours  Timing:  Constant  Progression:  Unchanged  Chronicity:  New  Context: awakening from sleep    Relieved by:  Nothing  Worsened by:  Nothing  Ineffective treatments:  None tried  Associated symptoms: chills, nausea and vomiting    Associated symptoms: no anorexia, no belching, no chest pain, no constipation, no cough, no diarrhea, no dysuria, no fatigue, no fever, no flatus, no hematemesis, no hematochezia, no hematuria, no melena, no shortness of breath, no sore throat, no vaginal bleeding and no vaginal discharge    Risk factors comment:  Known kidney stones      Prior to Admission Medications   Prescriptions Last Dose Informant Patient Reported? Taking?    Ibuprofen (MOTRIN PO)   Yes No   Sig: Take by mouth   Patient not taking: Reported on 2021   Naproxen Sodium (ALEVE PO)   Yes No   Sig: Take by mouth   Patient not taking: Reported on 2021   citalopram (CeleXA) 10 mg tablet   Yes No   Sig: Take 10 mg by mouth daily    Patient not taking: Reported on 2022    clonazePAM (KlonoPIN) 1 mg tablet   Yes No   Sig: Take 1 mg by mouth daily at bedtime   Patient not taking: Reported on 2021   fluticasone (FLONASE) 50 mcg/act nasal spray   No No   Si spray into each nostril daily   Patient not taking: Reported on 2021   ibuprofen (MOTRIN) 600 mg tablet   No No   Sig: Take 1 tablet (600 mg total) by mouth every 6 (six) hours as needed for moderate pain   Patient not taking: Reported on 3/3/2022    ibuprofen (MOTRIN) 600 mg tablet   No Yes   Sig: Take 1 tablet (600 mg total) by mouth every 6 (six) hours as needed for moderate pain   ibuprofen (MOTRIN) 800 mg tablet   No No   Sig: Take 1 tablet (800 mg total) by mouth every 8 (eight) hours as needed for mild pain or moderate pain for up to 10 days   Patient not taking: Reported on 11/3/2021   ketorolac (TORADOL) 10 mg tablet   No No   Sig: Take 1 tablet (10 mg total) by mouth every 6 (six) hours as needed for severe pain   Patient not taking: Reported on 12/18/2020   naproxen (NAPROSYN) 500 mg tablet   No No   Sig: Take 1 tablet (500 mg total) by mouth 2 (two) times a day with meals   Patient not taking: Reported on 2/3/2020   ondansetron (ZOFRAN) 4 mg tablet   No No   Sig: Take 1 tablet (4 mg total) by mouth every 8 (eight) hours as needed for nausea or vomiting   Patient not taking: Reported on 2/3/2020   ondansetron (ZOFRAN-ODT) 4 mg disintegrating tablet   No No   Sig: Take 1 tablet (4 mg total) by mouth every 6 (six) hours as needed for nausea or vomiting   Patient not taking: Reported on 12/18/2020   ondansetron (ZOFRAN-ODT) 4 mg disintegrating tablet   No No   Sig: Take 1 tablet (4 mg total) by mouth every 8 (eight) hours as needed for nausea or vomiting for up to 20 doses   Patient not taking: Reported on 11/3/2021   ondansetron (ZOFRAN-ODT) 4 mg disintegrating tablet   No Yes   Sig: Take 1 tablet (4 mg total) by mouth every 6 (six) hours as needed for nausea or vomiting   ondansetron (ZOFRAN-ODT) 8 mg disintegrating tablet   No No   Sig: Take 1 tablet (8 mg total) by mouth every 8 (eight) hours as needed for nausea or vomiting   Patient not taking: Reported on 9/17/2020   tamsulosin (FLOMAX) 0 4 mg   No No   Sig: Take 1 capsule (0 4 mg total) by mouth daily   Patient not taking: Reported on 12/18/2020   tamsulosin (FLOMAX) 0 4 mg   No Yes   Sig: Take 1 capsule (0 4 mg total) by mouth daily      Facility-Administered Medications: None       Past Medical History:   Diagnosis Date    Anxiety     Depression     Knee pain, right     Obesity Past Surgical History:   Procedure Laterality Date    ARTHROSCOPY KNEE Right 2019    Procedure: ARTHROSCOPY KNEE WITH MPFL REPAIR;  Surgeon: Elen Shirley MD;  Location: [facility] MAIN OR;  Service: Orthopedics    CYST REMOVAL      buttocks        Family History   Problem Relation Age of Onset    No Known Problems Father      I have reviewed and agree with the history as documented  E-Cigarette/Vaping    E-Cigarette Use Never User      E-Cigarette/Vaping Substances    Nicotine No     THC No     CBD No     Flavoring No     Other No     Unknown No      Social History     Tobacco Use    Smoking status: Former Smoker     Packs/day: 0 20     Quit date: 2019     Years since quittin 7    Smokeless tobacco: Former User     Quit date: 2019   Vaping Use    Vaping Use: Never used   Substance Use Topics    Alcohol use: No    Drug use: No       Review of Systems   Constitutional: Positive for chills  Negative for fatigue and fever  HENT: Negative for ear pain and sore throat  Eyes: Negative for pain and visual disturbance  Respiratory: Negative for cough and shortness of breath  Cardiovascular: Negative for chest pain and palpitations  Gastrointestinal: Positive for nausea and vomiting  Negative for abdominal pain, anorexia, constipation, diarrhea, flatus, hematemesis, hematochezia and melena  Genitourinary: Positive for flank pain  Negative for decreased urine volume, dysuria, frequency, hematuria, penile pain, penile swelling, scrotal swelling, testicular pain, urgency, vaginal bleeding and vaginal discharge  Musculoskeletal: Negative for arthralgias and back pain  Skin: Negative for color change and rash  Neurological: Negative for seizures and syncope  All other systems reviewed and are negative  Physical Exam  Physical Exam  Vitals and nursing note reviewed  Exam conducted with a chaperone present  Constitutional:       General: He is not in acute distress  Appearance: Normal appearance  He is well-developed  He is obese  He is not diaphoretic  HENT:      Head: Normocephalic and atraumatic  Right Ear: External ear normal       Left Ear: External ear normal       Nose: Nose normal       Mouth/Throat:      Mouth: Mucous membranes are moist       Pharynx: Oropharynx is clear  Eyes:      General: No scleral icterus  Conjunctiva/sclera: Conjunctivae normal    Cardiovascular:      Rate and Rhythm: Normal rate and regular rhythm  Pulses: Normal pulses  Heart sounds: Normal heart sounds  No murmur heard  Pulmonary:      Effort: Pulmonary effort is normal  No respiratory distress  Breath sounds: Normal breath sounds  Abdominal:      Palpations: Abdomen is soft  Tenderness: There is no abdominal tenderness  There is no right CVA tenderness or left CVA tenderness  Musculoskeletal:      Cervical back: Neck supple  Right lower leg: No edema  Left lower leg: No edema  Skin:     General: Skin is warm and dry  Capillary Refill: Capillary refill takes less than 2 seconds  Neurological:      General: No focal deficit present  Mental Status: He is alert  Mental status is at baseline           Vital Signs  ED Triage Vitals [06/06/22 0957]   Temperature Pulse Respirations Blood Pressure SpO2   97 8 °F (36 6 °C) 87 18 (S) (!) 173/94 100 %      Temp Source Heart Rate Source Patient Position - Orthostatic VS BP Location FiO2 (%)   Temporal Monitor Sitting Right arm --      Pain Score       9           Vitals:    06/06/22 1015 06/06/22 1115 06/06/22 1130 06/06/22 1200   BP: 139/85 137/70 120/56 121/81   Pulse: 76 84 71 89   Patient Position - Orthostatic VS: Lying Lying Lying Sitting         Visual Acuity      ED Medications  Medications   sodium chloride 0 9 % bolus 1,000 mL (1,000 mL Intravenous New Bag 6/6/22 1026)   ketorolac (TORADOL) injection 15 mg (15 mg Intravenous Given 6/6/22 1026)   ondansetron (ZOFRAN) injection 4 mg (4 mg Intravenous Given 6/6/22 1127)       Diagnostic Studies  Results Reviewed     Procedure Component Value Units Date/Time    UA w Reflex to Microscopic w Reflex to Culture [[id number]]  (Abnormal) Collected: 06/06/22 1228    Lab Status: Final result Specimen: Urine, Clean Catch Updated: 06/06/22 1239     Color, UA Yellow     Clarity, UA Slightly Cloudy     Specific Gravity, UA 1 025     pH, UA 5 5     Leukocytes, UA Trace     Nitrite, UA Negative     Protein, UA 30 (1+) mg/dl      Glucose, UA Negative mg/dl      Ketones, UA Negative mg/dl      Urobilinogen, UA 0 2 E U /dl      Bilirubin, UA Negative     Blood, UA Moderate    Urine Microscopic [[id number]] Collected: 06/06/22 1228    Lab Status:  In process Specimen: Urine, Clean Catch Updated: 06/06/22 8614    Basic metabolic panel [[id number]]  (Abnormal) Collected: 06/06/22 1027    Lab Status: Final result Specimen: Blood from Arm, Left Updated: 06/06/22 1048     Sodium 134 mmol/L      Potassium 3 8 mmol/L      Chloride 99 mmol/L      CO2 28 mmol/L      ANION GAP 7 mmol/L      BUN 16 mg/dL      Creatinine 1 17 mg/dL      Glucose 114 mg/dL      Calcium 9 5 mg/dL      eGFR 88 ml/min/1 73sq m     Narrative:      [place] guidelines for Chronic Kidney Disease (CKD):     Stage 1 with normal or high GFR (GFR > 90 mL/min/1 73 square meters)    Stage 2 Mild CKD (GFR = 60-89 mL/min/1 73 square meters)    Stage 3A Moderate CKD (GFR = 45-59 mL/min/1 73 square meters)    Stage 3B Moderate CKD (GFR = 30-44 mL/min/1 73 square meters)    Stage 4 Severe CKD (GFR = 15-29 mL/min/1 73 square meters)    Stage 5 End Stage CKD (GFR <15 mL/min/1 73 square meters)  Note: GFR calculation is accurate only with a steady state creatinine    CBC and differential [[id number]]  (Abnormal) Collected: 06/06/22 1027    Lab Status: Final result Specimen: Blood from Arm, Left Updated: 06/06/22 1037     WBC 10 61 Thousand/uL      RBC 4 68 Million/uL      Hemoglobin 13 3 g/dL      Hematocrit 39 8 %      MCV 85 fL      MCH 28 4 pg      MCHC 33 4 g/dL      RDW 12 9 %      MPV 9 3 fL      Platelets 303 Thousands/uL      nRBC 0 /100 WBCs      Neutrophils Relative 66 %      Immat GRANS % 1 %      Lymphocytes Relative 23 %      Monocytes Relative 8 %      Eosinophils Relative 1 %      Basophils Relative 1 %      Neutrophils Absolute 7 13 Thousands/µL      Immature Grans Absolute 0 05 Thousand/uL      Lymphocytes Absolute 2 42 Thousands/µL      Monocytes Absolute 0 84 Thousand/µL      Eosinophils Absolute 0 10 Thousand/µL      Basophils Absolute 0 07 Thousands/µL                  CT renal stone study abdomen pelvis without contrast   Final Result by Sheryl Huitron MD (06/06 1136)      Adjacent 3 mm calculi within the proximal left ureter with mild left hydronephrosis  Nonobstructing left renal calculus  Workstation performed: [id number]                    Procedures  Procedures         ED Course  ED Course as of 06/06/22 1244   Mon Jun 06, 2022   1226 3 mm proximal left ureteral stone  Mild left hydro  If urinalysis unremarkable will plan for outpatient management/expectant management will be prescribed Flomax, pain medication  SBIRT 22yo+    Flowsheet Row Most Recent Value   SBIRT (25 yo +)    In order to provide better care to our patients, we are screening all of our patients for alcohol and drug use  Would it be okay to ask you these screening questions? Yes Filed at: 06/06/2022 1140   Initial Alcohol Screen: US AUDIT-C     1  How often do you have a drink containing alcohol? 0 Filed at: 06/06/2022 1140   2  How many drinks containing alcohol do you have on a typical day you are drinking? 0 Filed at: 06/06/2022 1140   3a  Male UNDER 65: How often do you have five or more drinks on one occasion? 0 Filed at: 06/06/2022 1140   3b  FEMALE Any Age, or MALE 65+: How often do you have 4 or more drinks on one occassion?  0 Filed at: 06/06/2022 1140   Audit-C Score 0 Filed at: 06/06/2022 1140   ANN MARIE: How many times in the past year have you    Used an illegal drug or used a prescription medication for non-medical reasons? Never Filed at: 06/06/2022 1140                    [facility]  Number of Diagnoses or Management Options  Kidney stone on left side: new and requires workup     Amount and/or Complexity of Data Reviewed  Clinical lab tests: ordered and reviewed  Tests in the radiology section of CPT®: reviewed and ordered  Tests in the medicine section of CPT®: ordered and reviewed  Review and summarize past medical records: yes  Independent visualization of images, tracings, or specimens: yes    Risk of Complications, Morbidity, and/or Mortality  Presenting problems: moderate  Diagnostic procedures: moderate  Management options: moderate    Patient Progress  Patient progress: stable   acute onset left flank pain history of kidney stones  No new or atypical symptoms today  No history of aortic pathology  No trauma  Blood in urine no UTI  CT shows 3 mm proximal left ureteral stone with mild hydro  Will manage as outpatient with expectant management supportive care pain control      Disposition  Final diagnoses:   Kidney stone on left side     Time reflects when diagnosis was documented in both MDM as applicable and the Disposition within this note     Time User Action Codes Description Comment    6/6/2022 11:42 AM Ar Nehemias Add [N20 0] Kidney stone on left side     6/6/2022 12:43 PM Ar Nehemias Add [E98 330A] Foot sprain, left, initial encounter     6/6/2022 12:43 PM Ar Nehemias Add [D09 665K] Patellar dislocation, right, subsequent encounter     6/6/2022 12:43 PM Ar Nehemias Modify [S83 004D] Patellar dislocation, right, subsequent encounter     6/6/2022 12:43 PM Ar Nehemias Modify [S83 004D] Patellar dislocation, right, subsequent encounter     6/6/2022 12:43 PM Ar Nehemias Add [R10 9] Right flank pain     6/6/2022 12:43 PM Ron Cade Add [N20 0] Nephrolithiasis     6/6/2022 12:43 PM Ron Cade Modify [V89 552T] Patellar dislocation, right, subsequent encounter     6/6/2022 12:43 PM Uneeda Cade Modify [K82 531M] Patellar dislocation, right, subsequent encounter     6/6/2022 12:43 PM Uneeda Cade Modify [B62 139D] Patellar dislocation, right, subsequent encounter       ED Disposition     ED Disposition   Discharge    Condition   Stable    Date/Time   Mon Jun 6, 2022 12:42 PM    [address] discharge to home/self care  Follow-up Information     Follow up With Specialties Details Why Contact Info Additional Information    Shay Bazzi MD Pediatrics Schedule an appointment as soon as possible for a visit  As needed [employer] [address] [facility] [place] [facility] Schedule an appointment as soon as possible for a visit  If symptoms worsen [address] [phone]  701  [facility] [place], [address], [place], [address] [facility] Emergency Medicine Go to  If symptoms worsen [place] [phone]  [facility], [address], [address]          Current Discharge Medication List      CONTINUE these medications which have CHANGED    Details   !! ibuprofen (MOTRIN) 600 mg tablet Take 1 tablet (600 mg total) by mouth every 6 (six) hours as needed for moderate pain  Qty: 30 tablet, Refills: 0    Associated Diagnoses:  Foot sprain, left, initial encounter      !! ondansetron (ZOFRAN-ODT) 4 mg disintegrating tablet Take 1 tablet (4 mg total) by mouth every 6 (six) hours as needed for nausea or vomiting  Qty: 10 tablet, Refills: 0    Associated Diagnoses: Right flank pain; Nephrolithiasis      tamsulosin (FLOMAX) 0 4 mg Take 1 capsule (0 4 mg total) by mouth daily  Qty: 7 capsule, Refills: 0    Associated Diagnoses: Right flank pain; Nephrolithiasis       ! ! - Potential duplicate medications found  Please discuss with provider  CONTINUE these medications which have NOT CHANGED    Details   citalopram (CeleXA) 10 mg tablet Take 10 mg by mouth daily       clonazePAM (KlonoPIN) 1 mg tablet Take 1 mg by mouth daily at bedtime      fluticasone (FLONASE) 50 mcg/act nasal spray 1 spray into each nostril daily  Qty: 16 g, Refills: 0    Associated Diagnoses: Upper respiratory tract infection, unspecified type      !! Ibuprofen (MOTRIN PO) Take by mouth      ketorolac (TORADOL) 10 mg tablet Take 1 tablet (10 mg total) by mouth every 6 (six) hours as needed for severe pain  Qty: 20 tablet, Refills: 0    Associated Diagnoses: Right flank pain; Nephrolithiasis      naproxen (NAPROSYN) 500 mg tablet Take 1 tablet (500 mg total) by mouth 2 (two) times a day with meals  Qty: 60 tablet, Refills: 1    Associated Diagnoses: Patellar dislocation, right, subsequent encounter      Naproxen Sodium (ALEVE PO) Take by mouth      ondansetron (ZOFRAN) 4 mg tablet Take 1 tablet (4 mg total) by mouth every 8 (eight) hours as needed for nausea or vomiting  Qty: 10 tablet, Refills: 0    Associated Diagnoses: Patellar dislocation, right, subsequent encounter      !! ondansetron (ZOFRAN-ODT) 4 mg disintegrating tablet Take 1 tablet (4 mg total) by mouth every 8 (eight) hours as needed for nausea or vomiting for up to 20 doses  Qty: 20 tablet, Refills: 0    Associated Diagnoses: Left flank pain      !! ondansetron (ZOFRAN-ODT) 8 mg disintegrating tablet Take 1 tablet (8 mg total) by mouth every 8 (eight) hours as needed for nausea or vomiting  Qty: 20 tablet, Refills: 0    Associated Diagnoses: Viral URI with cough       !! - Potential duplicate medications found  Please discuss with provider  No discharge procedures on file      PDMP Review     None          ED Provider  Electronically Signed by           Alanna Trevino,   06/06/22 0883

## 2022-07-01 ENCOUNTER — HOSPITAL ENCOUNTER (EMERGENCY)
Facility: HOSPITAL | Age: 22
Discharge: HOME | End: 2022-07-01
Attending: EMERGENCY MEDICINE
Payer: COMMERCIAL

## 2022-07-01 VITALS
SYSTOLIC BLOOD PRESSURE: 129 MMHG | OXYGEN SATURATION: 96 % | HEART RATE: 84 BPM | RESPIRATION RATE: 18 BRPM | TEMPERATURE: 98 F | DIASTOLIC BLOOD PRESSURE: 55 MMHG

## 2022-07-01 DIAGNOSIS — R00.2 PALPITATIONS: Primary | ICD-10-CM

## 2022-07-01 LAB
ANION GAP SERPL CALCULATED.3IONS-SCNC: 9 MMOL/L (ref 4–13)
APTT PPP: 34 SECONDS (ref 23–37)
BASOPHILS # BLD AUTO: 0.07 THOUSANDS/ΜL (ref 0–0.1)
BASOPHILS NFR BLD AUTO: 1 % (ref 0–1)
BUN SERPL-MCNC: 12 MG/DL (ref 5–25)
CALCIUM SERPL-MCNC: 9.4 MG/DL (ref 8.3–10.1)
CARDIAC TROPONIN I PNL SERPL HS: 2 NG/L
CHLORIDE SERPL-SCNC: 102 MMOL/L (ref 100–108)
CO2 SERPL-SCNC: 27 MMOL/L (ref 21–32)
CREAT SERPL-MCNC: 0.84 MG/DL (ref 0.6–1.3)
EOSINOPHIL # BLD AUTO: 0.2 THOUSAND/ΜL (ref 0–0.61)
EOSINOPHIL NFR BLD AUTO: 2 % (ref 0–6)
ERYTHROCYTE [DISTWIDTH] IN BLOOD BY AUTOMATED COUNT: 12.9 % (ref 11.6–15.1)
GFR SERPL CREATININE-BSD FRML MDRD: 125 ML/MIN/1.73SQ M
GLUCOSE SERPL-MCNC: 115 MG/DL (ref 65–140)
HCT VFR BLD AUTO: 39.3 % (ref 36.5–49.3)
HGB BLD-MCNC: 13.3 G/DL (ref 12–17)
IMM GRANULOCYTES # BLD AUTO: 0.07 THOUSAND/UL (ref 0–0.2)
IMM GRANULOCYTES NFR BLD AUTO: 1 % (ref 0–2)
INR PPP: 1.09 (ref 0.84–1.19)
LYMPHOCYTES # BLD AUTO: 3.61 THOUSANDS/ΜL (ref 0.6–4.47)
LYMPHOCYTES NFR BLD AUTO: 29 % (ref 14–44)
MCH RBC QN AUTO: 28.7 PG (ref 26.8–34.3)
MCHC RBC AUTO-ENTMCNC: 33.8 G/DL (ref 31.4–37.4)
MCV RBC AUTO: 85 FL (ref 82–98)
MONOCYTES # BLD AUTO: 0.93 THOUSAND/ΜL (ref 0.17–1.22)
MONOCYTES NFR BLD AUTO: 8 % (ref 4–12)
NEUTROPHILS # BLD AUTO: 7.53 THOUSANDS/ΜL (ref 1.85–7.62)
NEUTS SEG NFR BLD AUTO: 59 % (ref 43–75)
NRBC BLD AUTO-RTO: 0 /100 WBCS
PLATELET # BLD AUTO: 355 THOUSANDS/UL (ref 149–390)
PMV BLD AUTO: 9.1 FL (ref 8.9–12.7)
POTASSIUM SERPL-SCNC: 3.4 MMOL/L (ref 3.5–5.3)
PROTHROMBIN TIME: 13.6 SECONDS (ref 11.6–14.5)
RBC # BLD AUTO: 4.63 MILLION/UL (ref 3.88–5.62)
SODIUM SERPL-SCNC: 138 MMOL/L (ref 136–145)
TSH SERPL DL<=0.05 MIU/L-ACNC: 3.26 UIU/ML (ref 0.45–4.5)
WBC # BLD AUTO: 12.41 THOUSAND/UL (ref 4.31–10.16)

## 2022-07-01 PROCEDURE — 80048 BASIC METABOLIC PNL TOTAL CA: CPT | Performed by: EMERGENCY MEDICINE

## 2022-07-01 PROCEDURE — 93005 ELECTROCARDIOGRAM TRACING: CPT

## 2022-07-01 PROCEDURE — 85730 THROMBOPLASTIN TIME PARTIAL: CPT | Performed by: EMERGENCY MEDICINE

## 2022-07-01 PROCEDURE — 85025 COMPLETE CBC W/AUTO DIFF WBC: CPT | Performed by: EMERGENCY MEDICINE

## 2022-07-01 PROCEDURE — 99285 EMERGENCY DEPT VISIT HI MDM: CPT | Performed by: EMERGENCY MEDICINE

## 2022-07-01 PROCEDURE — 36415 COLL VENOUS BLD VENIPUNCTURE: CPT | Performed by: EMERGENCY MEDICINE

## 2022-07-01 PROCEDURE — 85610 PROTHROMBIN TIME: CPT | Performed by: EMERGENCY MEDICINE

## 2022-07-01 PROCEDURE — 84484 ASSAY OF TROPONIN QUANT: CPT | Performed by: EMERGENCY MEDICINE

## 2022-07-01 PROCEDURE — 99285 EMERGENCY DEPT VISIT HI MDM: CPT

## 2022-07-01 PROCEDURE — 84443 ASSAY THYROID STIM HORMONE: CPT | Performed by: EMERGENCY MEDICINE

## 2022-07-01 NOTE — Clinical Note
Alisa Chanel was seen and treated in our emergency department on 7/1/2022  No restrictions            Diagnosis:     Mikel Galeas  may return to work on return date  He may return on this date: 07/02/2022         If you have any questions or concerns, please don't hesitate to call        Claudia Gray MD    ______________________________           _______________          _______________  Hospital Representative                              Date                                Time

## 2022-07-01 NOTE — ED PROVIDER NOTES
History  Chief Complaint   Patient presents with    Palpitations     Patient felt palpitations before bed     This is a 62-year-old male who presents with palpitations  At around 11:00 p m  This evening, patient was lying down to go to sleep when he started to feel palpitations and chest discomfort  He has never experienced these symptoms in the past   Ultimately, decided to call EMS  Since onset, his symptoms have gradually improved  Denies any associated nausea/vomiting, shortness of breath, chest pain  Denies fever/chills, nausea/vomiting, lightheadedness/dizziness, numbness/weakness, headache, change in vision, URI symptoms, neck pain, chest pain, shortness of breath, cough, back pain, flank pain, abdominal pain, diarrhea, hematochezia, melena, dysuria, hematuria  Prior to Admission Medications   Prescriptions Last Dose Informant Patient Reported? Taking?    Ibuprofen (MOTRIN PO)   Yes No   Sig: Take by mouth   Patient not taking: Reported on 2021   Naproxen Sodium (ALEVE PO)   Yes No   Sig: Take by mouth   Patient not taking: Reported on 2021   citalopram (CeleXA) 10 mg tablet   Yes No   Sig: Take 10 mg by mouth daily    Patient not taking: Reported on 2022    clonazePAM (KlonoPIN) 1 mg tablet   Yes No   Sig: Take 1 mg by mouth daily at bedtime   Patient not taking: Reported on 2021   fluticasone (FLONASE) 50 mcg/act nasal spray   No No   Si spray into each nostril daily   Patient not taking: Reported on 2021   ibuprofen (MOTRIN) 600 mg tablet   No No   Sig: Take 1 tablet (600 mg total) by mouth every 6 (six) hours as needed for moderate pain   ibuprofen (MOTRIN) 800 mg tablet   No No   Sig: Take 1 tablet (800 mg total) by mouth every 8 (eight) hours as needed for mild pain or moderate pain for up to 10 days   Patient not taking: Reported on 11/3/2021   ketorolac (TORADOL) 10 mg tablet   No No   Sig: Take 1 tablet (10 mg total) by mouth every 6 (six) hours as needed for severe pain   Patient not taking: Reported on 12/18/2020   naproxen (NAPROSYN) 500 mg tablet   No No   Sig: Take 1 tablet (500 mg total) by mouth 2 (two) times a day with meals   Patient not taking: Reported on 2/3/2020   ondansetron (ZOFRAN) 4 mg tablet   No No   Sig: Take 1 tablet (4 mg total) by mouth every 8 (eight) hours as needed for nausea or vomiting   Patient not taking: Reported on 2/3/2020   ondansetron (ZOFRAN-ODT) 4 mg disintegrating tablet   No No   Sig: Take 1 tablet (4 mg total) by mouth every 8 (eight) hours as needed for nausea or vomiting for up to 20 doses   Patient not taking: Reported on 11/3/2021   ondansetron (ZOFRAN-ODT) 4 mg disintegrating tablet   No No   Sig: Take 1 tablet (4 mg total) by mouth every 6 (six) hours as needed for nausea or vomiting   ondansetron (ZOFRAN-ODT) 8 mg disintegrating tablet   No No   Sig: Take 1 tablet (8 mg total) by mouth every 8 (eight) hours as needed for nausea or vomiting   Patient not taking: Reported on 9/17/2020   tamsulosin (FLOMAX) 0 4 mg Not Taking at Unknown time  No No   Sig: Take 1 capsule (0 4 mg total) by mouth daily   Patient not taking: Reported on 7/1/2022      Facility-Administered Medications: None       Past Medical History:   Diagnosis Date    Anxiety     Depression     Knee pain, right     Obesity        Past Surgical History:   Procedure Laterality Date    ARTHROSCOPY KNEE Right 11/14/2019    Procedure: ARTHROSCOPY KNEE WITH MPFL REPAIR;  Surgeon: Marco Powers MD;  Location: [address] [place] [address] OR;  Service: Orthopedics    CYST REMOVAL      buttocks        Family History   Problem Relation Age of Onset    No Known Problems Father      I have reviewed and agree with the history as documented      E-Cigarette/Vaping    E-Cigarette Use Current Every Day User      E-Cigarette/Vaping Substances    Nicotine No     THC No     CBD No     Flavoring No     Other No     Unknown No      Social History     Tobacco Use    Smoking status: Former Smoker     Packs/day: 0 20     Quit date: 2019     Years since quittin 8    Smokeless tobacco: Former User     Quit date: 2019   Vaping Use    Vaping Use: Every day   Substance Use Topics    Alcohol use: No    Drug use: No       Review of Systems   Constitutional: Negative for chills, fatigue and fever  HENT: Negative for rhinorrhea, sore throat and trouble swallowing  Eyes: Negative for photophobia and visual disturbance  Respiratory: Negative for cough, chest tightness and shortness of breath  Cardiovascular: Positive for palpitations  Negative for chest pain and leg swelling  Gastrointestinal: Negative for abdominal pain, blood in stool, diarrhea, nausea and vomiting  Endocrine: Negative for polyuria  Genitourinary: Negative for dysuria, flank pain and hematuria  Musculoskeletal: Negative for back pain and neck pain  Skin: Negative for color change and rash  Allergic/Immunologic: Negative for immunocompromised state  Neurological: Negative for dizziness, weakness, light-headedness, numbness and headaches  All other systems reviewed and are negative  Physical Exam  Physical Exam  Constitutional:       General: He is not in acute distress  Appearance: Normal appearance  He is well-developed  He is morbidly obese  HENT:      Mouth/Throat:      Pharynx: Uvula midline  Eyes:      General: Lids are normal       Conjunctiva/sclera: Conjunctivae normal       Pupils: Pupils are equal, round, and reactive to light  Neck:      Thyroid: No thyroid mass or thyromegaly  Trachea: Trachea normal    Cardiovascular:      Rate and Rhythm: Normal rate and regular rhythm  Heart sounds: Normal heart sounds  No murmur heard  Pulmonary:      Effort: Pulmonary effort is normal       Breath sounds: Normal breath sounds  Abdominal:      General: Bowel sounds are normal       Palpations: Abdomen is soft  Tenderness: There is no abdominal tenderness   There is no guarding or rebound  Negative signs include Newton's sign  Skin:     General: Skin is warm and dry  Neurological:      Mental Status: He is alert  Psychiatric:         Speech: Speech normal          Behavior: Behavior normal  Behavior is cooperative  Thought Content:  Thought content normal          Vital Signs  ED Triage Vitals [07/01/22 0020]   Temperature Pulse Respirations Blood Pressure SpO2   98 °F (36 7 °C) 91 18 133/82 98 %      Temp Source Heart Rate Source Patient Position - Orthostatic VS BP Location FiO2 (%)   Tympanic Monitor Sitting Right arm --      Pain Score       No Pain           Vitals:    07/01/22 0020 07/01/22 0130   BP: 133/82 129/55   Pulse: 91 84   Patient Position - Orthostatic VS: Sitting Lying         Visual Acuity      ED Medications  Medications - No data to display    Diagnostic Studies  Results Reviewed     Procedure Component Value Units Date/Time    Basic metabolic panel [[id number]]  (Abnormal) Collected: 07/01/22 0027    Lab Status: Final result Specimen: Blood from Arm, Left Updated: 07/01/22 0056     Sodium 138 mmol/L      Potassium 3 4 mmol/L      Chloride 102 mmol/L      CO2 27 mmol/L      ANION GAP 9 mmol/L      BUN 12 mg/dL      Creatinine 0 84 mg/dL      Glucose 115 mg/dL      Calcium 9 4 mg/dL      eGFR 125 ml/min/1 73sq m     Narrative:      [place] guidelines for Chronic Kidney Disease (CKD):     Stage 1 with normal or high GFR (GFR > 90 mL/min/1 73 square meters)    Stage 2 Mild CKD (GFR = 60-89 mL/min/1 73 square meters)    Stage 3A Moderate CKD (GFR = 45-59 mL/min/1 73 square meters)    Stage 3B Moderate CKD (GFR = 30-44 mL/min/1 73 square meters)    Stage 4 Severe CKD (GFR = 15-29 mL/min/1 73 square meters)    Stage 5 End Stage CKD (GFR <15 mL/min/1 73 square meters)  Note: GFR calculation is accurate only with a steady state creatinine    TSH [[id number]]  (Normal) Collected: 07/01/22 0027    Lab Status: Final result Specimen: Blood from Arm, Left Updated: 07/01/22 0056     TSH 3RD GENERATON 3 256 uIU/mL     Narrative:      Patients undergoing fluorescein dye angiography may retain small amounts of fluorescein in the body for 48-72 hours post procedure  Samples containing fluorescein can produce falsely depressed TSH values  If the patient had this procedure,a specimen should be resubmitted post fluorescein clearance        HS Troponin 0hr (reflex protocol) [[id number]]  (Normal) Collected: 07/01/22 0027    Lab Status: Final result Specimen: Blood from Arm, Left Updated: 07/01/22 0056     hs TnI 0hr 2 ng/L     HS Troponin I 2hr [[id number]]     Lab Status: No result Specimen: Blood     Protime-INR [[id number]]  (Normal) Collected: 07/01/22 0027    Lab Status: Final result Specimen: Blood from Arm, Left Updated: 07/01/22 0046     Protime 13 6 seconds      INR 1 09    APTT [[id number]]  (Normal) Collected: 07/01/22 0027    Lab Status: Final result Specimen: Blood from Arm, Left Updated: 07/01/22 0046     PTT 34 seconds     CBC and differential [[id number]]  (Abnormal) Collected: 07/01/22 0027    Lab Status: Final result Specimen: Blood from Arm, Left Updated: 07/01/22 0035     WBC 12 41 Thousand/uL      RBC 4 63 Million/uL      Hemoglobin 13 3 g/dL      Hematocrit 39 3 %      MCV 85 fL      MCH 28 7 pg      MCHC 33 8 g/dL      RDW 12 9 %      MPV 9 1 fL      Platelets 145 Thousands/uL      nRBC 0 /100 WBCs      Neutrophils Relative 59 %      Immat GRANS % 1 %      Lymphocytes Relative 29 %      Monocytes Relative 8 %      Eosinophils Relative 2 %      Basophils Relative 1 %      Neutrophils Absolute 7 53 Thousands/µL      Immature Grans Absolute 0 07 Thousand/uL      Lymphocytes Absolute 3 61 Thousands/µL      Monocytes Absolute 0 93 Thousand/µL      Eosinophils Absolute 0 20 Thousand/µL      Basophils Absolute 0 07 Thousands/µL                  No orders to display              Procedures  ECG 12 Lead Documentation Only    Date/Time: 7/1/2022 12:28 AM  Performed by: Claudia Gray MD  Authorized by: Claudia Gray MD     ECG reviewed by me, the ED Provider: yes    Patient location:  ED  Previous ECG:     Previous ECG:  Unavailable    Comparison to cardiac monitor: Yes    Interpretation:     Interpretation: normal    Rate:     ECG rate:  93    ECG rate assessment: normal    Rhythm:     Rhythm: sinus rhythm    Ectopy:     Ectopy: none    QRS:     QRS axis:  Normal    QRS intervals:  Normal  Conduction:     Conduction: normal    ST segments:     ST segments:  Normal  T waves:     T waves: normal               ED Course                               SBIRT 20yo+    Flowsheet Row Most Recent Value   SBIRT (25 yo +)    In order to provide better care to our patients, we are screening all of our patients for alcohol and drug use  Would it be okay to ask you these screening questions? Yes Filed at: 07/01/2022 0041   Initial Alcohol Screen: US AUDIT-C     1  How often do you have a drink containing alcohol? 0 Filed at: 07/01/2022 0041   2  How many drinks containing alcohol do you have on a typical day you are drinking? 0 Filed at: 07/01/2022 0041   3a  Male UNDER 65: How often do you have five or more drinks on one occasion? 0 Filed at: 07/01/2022 0041   3b  FEMALE Any Age, or MALE 65+: How often do you have 4 or more drinks on one occassion? 0 Filed at: 07/01/2022 0041   Audit-C Score 0 Filed at: 07/01/2022 0041   ANN MARIE: How many times in the past year have you    Used an illegal drug or used a prescription medication for non-medical reasons? Never Filed at: 07/01/2022 0041                    MDM  Number of Diagnoses or Management Options  Diagnosis management comments: Will check labs, EKG  Disposition pending results        Disposition  Final diagnoses:   Palpitations     Time reflects when diagnosis was documented in both MDM as applicable and the Disposition within this note     Time User Action Codes Description Comment    7/1/2022  1:50 AM Kyree Scanlon Add [R00 2] Palpitations       ED Disposition     ED Disposition   Discharge    Condition   Stable    Date/Time   Fri Jul 1, 2022  1:50 AM    Comment   Radha Mcclellan discharge to home/self care  Follow-up Information     Follow up With Specialties Details Why Contact Info Additional Information    Jim Caba MD Pediatrics Schedule an appointment as soon as possible for a visit   [address] [phone]       [facility] Emergency Department Emergency Medicine Go to  If symptoms worsen [facility] [id number]  [address] [place] [address] Emergency Department[address], [address]          Patient's Medications   Discharge Prescriptions    No medications on file       No discharge procedures on file      PDMP Review     None          ED Provider  Electronically Signed by           Omero Chung MD  07/01/22 1228

## 2022-07-07 LAB
ATRIAL RATE: 93 BPM
P AXIS: 30 DEGREES
PR INTERVAL: 186 MS
QRS AXIS: 38 DEGREES
QRSD INTERVAL: 94 MS
QT INTERVAL: 350 MS
QTC INTERVAL: 435 MS
T WAVE AXIS: 10 DEGREES
VENTRICULAR RATE: 93 BPM

## 2022-07-07 PROCEDURE — 93010 ELECTROCARDIOGRAM REPORT: CPT | Performed by: INTERNAL MEDICINE

## 2022-09-30 ENCOUNTER — OFFICE VISIT (OUTPATIENT)
Dept: URGENT CARE | Facility: MEDICAL CENTER | Age: 22
End: 2022-09-30
Payer: COMMERCIAL

## 2022-09-30 VITALS
RESPIRATION RATE: 20 BRPM | TEMPERATURE: 98.5 F | DIASTOLIC BLOOD PRESSURE: 90 MMHG | BODY MASS INDEX: 58.69 KG/M2 | WEIGHT: 315 LBS | OXYGEN SATURATION: 98 % | HEART RATE: 94 BPM | SYSTOLIC BLOOD PRESSURE: 132 MMHG

## 2022-09-30 DIAGNOSIS — L03.316 CELLULITIS OF UMBILICUS: Primary | ICD-10-CM

## 2022-09-30 PROCEDURE — 87147 CULTURE TYPE IMMUNOLOGIC: CPT | Performed by: PHYSICIAN ASSISTANT

## 2022-09-30 PROCEDURE — 87205 SMEAR GRAM STAIN: CPT | Performed by: PHYSICIAN ASSISTANT

## 2022-09-30 PROCEDURE — 99202 OFFICE O/P NEW SF 15 MIN: CPT | Performed by: PHYSICIAN ASSISTANT

## 2022-09-30 PROCEDURE — 87070 CULTURE OTHR SPECIMN AEROBIC: CPT | Performed by: PHYSICIAN ASSISTANT

## 2022-09-30 PROCEDURE — S9088 SERVICES PROVIDED IN URGENT: HCPCS | Performed by: PHYSICIAN ASSISTANT

## 2022-09-30 RX ORDER — SULFAMETHOXAZOLE AND TRIMETHOPRIM 800; 160 MG/1; MG/1
1 TABLET ORAL 2 TIMES DAILY
Qty: 14 TABLET | Refills: 0 | Status: SHIPPED | OUTPATIENT
Start: 2022-09-30 | End: 2022-10-07

## 2022-09-30 NOTE — PROGRESS NOTES
[facility]        NAME: Vannesa Villa is a 25 y o  male  : 2000    MRN: [id number]  DATE: 2022  TIME: 3:46 PM    Assessment and Plan   Cellulitis of umbilicus [Z21 987]  1  Cellulitis of umbilicus  sulfamethoxazole-trimethoprim (BACTRIM DS) 800-160 mg per tablet    Wound culture and Gram stain         Patient Instructions       Follow up with PCP in 3-5 days  Proceed to  ER if symptoms worsen  Chief Complaint     Chief Complaint   Patient presents with    belly button with pus and drainage         History of Present Illness       Patient started with drainage from his umbilicus several months ago  Over the past month he has been applying Neosporin  The drainage can be bloody purulent or dark  No fever or chills  Review of Systems   Review of Systems   Constitutional: Negative for chills and fever  Skin: Positive for wound  Hematological: Negative for adenopathy           Current Medications       Current Outpatient Medications:     sulfamethoxazole-trimethoprim (BACTRIM DS) 800-160 mg per tablet, Take 1 tablet by mouth 2 (two) times a day for 7 days, Disp: 14 tablet, Rfl: 0    citalopram (CeleXA) 10 mg tablet, Take 10 mg by mouth daily  (Patient not taking: No sig reported), Disp: , Rfl:     clonazePAM (KlonoPIN) 1 mg tablet, Take 1 mg by mouth daily at bedtime (Patient not taking: No sig reported), Disp: , Rfl:     fluticasone (FLONASE) 50 mcg/act nasal spray, 1 spray into each nostril daily (Patient not taking: No sig reported), Disp: 16 g, Rfl: 0    Ibuprofen (MOTRIN PO), Take by mouth (Patient not taking: No sig reported), Disp: , Rfl:     ibuprofen (MOTRIN) 600 mg tablet, Take 1 tablet (600 mg total) by mouth every 6 (six) hours as needed for moderate pain (Patient not taking: Reported on 2022), Disp: 30 tablet, Rfl: 0    ibuprofen (MOTRIN) 800 mg tablet, Take 1 tablet (800 mg total) by mouth every 8 (eight) hours as needed for mild pain or moderate pain for up to 10 days (Patient not taking: Reported on 11/3/2021), Disp: 30 tablet, Rfl: 0    ketorolac (TORADOL) 10 mg tablet, Take 1 tablet (10 mg total) by mouth every 6 (six) hours as needed for severe pain (Patient not taking: No sig reported), Disp: 20 tablet, Rfl: 0    naproxen (NAPROSYN) 500 mg tablet, Take 1 tablet (500 mg total) by mouth 2 (two) times a day with meals (Patient not taking: No sig reported), Disp: 60 tablet, Rfl: 1    Naproxen Sodium (ALEVE PO), Take by mouth (Patient not taking: No sig reported), Disp: , Rfl:     ondansetron (ZOFRAN) 4 mg tablet, Take 1 tablet (4 mg total) by mouth every 8 (eight) hours as needed for nausea or vomiting (Patient not taking: No sig reported), Disp: 10 tablet, Rfl: 0    ondansetron (ZOFRAN-ODT) 4 mg disintegrating tablet, Take 1 tablet (4 mg total) by mouth every 8 (eight) hours as needed for nausea or vomiting for up to 20 doses (Patient not taking: No sig reported), Disp: 20 tablet, Rfl: 0    ondansetron (ZOFRAN-ODT) 4 mg disintegrating tablet, Take 1 tablet (4 mg total) by mouth every 6 (six) hours as needed for nausea or vomiting (Patient not taking: Reported on 9/30/2022), Disp: 10 tablet, Rfl: 0    ondansetron (ZOFRAN-ODT) 8 mg disintegrating tablet, Take 1 tablet (8 mg total) by mouth every 8 (eight) hours as needed for nausea or vomiting (Patient not taking: No sig reported), Disp: 20 tablet, Rfl: 0    tamsulosin (FLOMAX) 0 4 mg, Take 1 capsule (0 4 mg total) by mouth daily (Patient not taking: No sig reported), Disp: 7 capsule, Rfl: 0    Current Allergies     Allergies as of 09/30/2022    (No Known Allergies)            The following portions of the patient's history were reviewed and updated as appropriate: allergies, current medications, past family history, past medical history, past social history, past surgical history and problem list      Past Medical History:   Diagnosis Date    Anxiety     Depression     Knee pain, right     Obesity        Past Surgical History:   Procedure Laterality Date    ARTHROSCOPY KNEE Right 11/14/2019    Procedure: ARTHROSCOPY KNEE WITH MPFL REPAIR;  Surgeon: Marko Dunham MD;  Location: [address] [place] [address] MAIN OR;  Service: Orthopedics    CYST REMOVAL      buttocks        Family History   Problem Relation Age of Onset    No Known Problems Father          Medications have been verified  Objective   /90   Pulse 94   Temp 98 5 °F (36 9 °C)   Resp 20   Wt (!) 180 kg (397 lb 6 4 oz)   SpO2 98%   BMI 58 69 kg/m²   No LMP for male patient  Physical Exam     Physical Exam  Vitals and nursing note reviewed  Constitutional:       Appearance: Normal appearance  HENT:      Head: Normocephalic and atraumatic  Cardiovascular:      Rate and Rhythm: Normal rate and regular rhythm  Pulmonary:      Effort: Pulmonary effort is normal    Abdominal:      Comments: Very mild erythema around the umbilicus  Umbilicus probed with a bloody drainage in return  Sample sent for culture  No pain on palpation  Skin:     General: Skin is warm  Neurological:      Mental Status: He is alert

## 2022-10-02 ENCOUNTER — TELEPHONE (OUTPATIENT)
Dept: URGENT CARE | Facility: MEDICAL CENTER | Age: 22
End: 2022-10-02

## 2022-10-02 DIAGNOSIS — L03.316 CELLULITIS OF UMBILICUS: Primary | ICD-10-CM

## 2022-10-02 LAB
BACTERIA WND AEROBE CULT: ABNORMAL
BACTERIA WND AEROBE CULT: ABNORMAL
GRAM STN SPEC: ABNORMAL

## 2022-10-02 RX ORDER — CEPHALEXIN 500 MG/1
500 CAPSULE ORAL EVERY 8 HOURS SCHEDULED
Qty: 21 CAPSULE | Refills: 0 | Status: SHIPPED | OUTPATIENT
Start: 2022-10-02 | End: 2022-10-09

## 2022-10-02 NOTE — TELEPHONE ENCOUNTER
Spoke with patient advised of wound culture results  Stop Bactrim and start Keflex 500 mg 3 times daily for 7 days

## 2022-10-24 ENCOUNTER — HOSPITAL ENCOUNTER (EMERGENCY)
Facility: HOSPITAL | Age: 22
Discharge: HOME | End: 2022-10-24
Attending: EMERGENCY MEDICINE
Payer: COMMERCIAL

## 2022-10-24 VITALS
RESPIRATION RATE: 18 BRPM | OXYGEN SATURATION: 97 % | TEMPERATURE: 97.8 F | HEART RATE: 85 BPM | SYSTOLIC BLOOD PRESSURE: 159 MMHG | DIASTOLIC BLOOD PRESSURE: 76 MMHG

## 2022-10-24 DIAGNOSIS — R03.0 ELEVATED BP WITHOUT DIAGNOSIS OF HYPERTENSION: ICD-10-CM

## 2022-10-24 DIAGNOSIS — R19.8 UMBILICUS DISCHARGE: Primary | ICD-10-CM

## 2022-10-24 PROCEDURE — 87147 CULTURE TYPE IMMUNOLOGIC: CPT | Performed by: PHYSICIAN ASSISTANT

## 2022-10-24 PROCEDURE — 87070 CULTURE OTHR SPECIMN AEROBIC: CPT | Performed by: PHYSICIAN ASSISTANT

## 2022-10-24 PROCEDURE — 99284 EMERGENCY DEPT VISIT MOD MDM: CPT | Performed by: PHYSICIAN ASSISTANT

## 2022-10-24 PROCEDURE — 87205 SMEAR GRAM STAIN: CPT | Performed by: PHYSICIAN ASSISTANT

## 2022-10-24 RX ORDER — NYSTATIN 100000 [USP'U]/G
POWDER TOPICAL 3 TIMES DAILY
Qty: 30 G | Refills: 0 | Status: SHIPPED | OUTPATIENT
Start: 2022-10-24

## 2022-10-24 NOTE — DISCHARGE INSTRUCTIONS
Use topical nystatin as directed two to three times per day  We will contact you to follow up on wound culture results  Continue to monitor your blood pressure and follow up with your PCP  Follow up with PCP for recheck or return to ER as needed

## 2022-10-24 NOTE — ED PROVIDER NOTES
History  Chief Complaint   Patient presents with   • Cellulitis     Patient states he is being treated for cellulitis of his bellybutton  Patient states took antibiotics already but has not improved  25year old male with PMH anxiety/depression, obesity presents for evaluation of drainage from his belly button  He reports he's had some issues over the past month  Was seen previously and placed on antibiotics but states it persists  He notes redness however has gotten better  Still has drainage which he notes is purulent in nature and foul smelling  He notes the area is itchy at times  No reported aggravating or alleviating factors  Denies fever, chills, cough, congestion or recent illness  Denies chest pain, SOB, N/V/D/C, abdominal pain  Denies headache, dizziness, lightheadedness, visual disturbance  Denies any urinary complaints such as dysuria, frequency, urgency, hematuria  Denies flank or back pain  No rashes or wounds reported  Pt denies h/o diabetes  History provided by:  Patient and medical records   used: No        Prior to Admission Medications   Prescriptions Last Dose Informant Patient Reported? Taking?    Ibuprofen (MOTRIN PO)   Yes No   Sig: Take by mouth   Patient not taking: No sig reported   Naproxen Sodium (ALEVE PO)   Yes No   Sig: Take by mouth   Patient not taking: No sig reported   citalopram (CeleXA) 10 mg tablet   Yes No   Sig: Take 10 mg by mouth daily    Patient not taking: No sig reported   clonazePAM (KlonoPIN) 1 mg tablet   Yes No   Sig: Take 1 mg by mouth daily at bedtime   Patient not taking: No sig reported   fluticasone (FLONASE) 50 mcg/act nasal spray   No No   Si spray into each nostril daily   Patient not taking: No sig reported   ibuprofen (MOTRIN) 600 mg tablet   No No   Sig: Take 1 tablet (600 mg total) by mouth every 6 (six) hours as needed for moderate pain   Patient not taking: Reported on 2022   ibuprofen (MOTRIN) 800 mg tablet   No No   Sig: Take 1 tablet (800 mg total) by mouth every 8 (eight) hours as needed for mild pain or moderate pain for up to 10 days   Patient not taking: Reported on 11/3/2021   ketorolac (TORADOL) 10 mg tablet   No No   Sig: Take 1 tablet (10 mg total) by mouth every 6 (six) hours as needed for severe pain   Patient not taking: No sig reported   naproxen (NAPROSYN) 500 mg tablet   No No   Sig: Take 1 tablet (500 mg total) by mouth 2 (two) times a day with meals   Patient not taking: No sig reported   ondansetron (ZOFRAN) 4 mg tablet   No No   Sig: Take 1 tablet (4 mg total) by mouth every 8 (eight) hours as needed for nausea or vomiting   Patient not taking: No sig reported   ondansetron (ZOFRAN-ODT) 4 mg disintegrating tablet   No No   Sig: Take 1 tablet (4 mg total) by mouth every 8 (eight) hours as needed for nausea or vomiting for up to 20 doses   Patient not taking: No sig reported   ondansetron (ZOFRAN-ODT) 4 mg disintegrating tablet   No No   Sig: Take 1 tablet (4 mg total) by mouth every 6 (six) hours as needed for nausea or vomiting   Patient not taking: Reported on 9/30/2022   ondansetron (ZOFRAN-ODT) 8 mg disintegrating tablet   No No   Sig: Take 1 tablet (8 mg total) by mouth every 8 (eight) hours as needed for nausea or vomiting   Patient not taking: No sig reported   tamsulosin (FLOMAX) 0 4 mg   No No   Sig: Take 1 capsule (0 4 mg total) by mouth daily   Patient not taking: No sig reported      Facility-Administered Medications: None       Past Medical History:   Diagnosis Date   • Anxiety    • Depression    • Knee pain, right    • Obesity        Past Surgical History:   Procedure Laterality Date   • ARTHROSCOPY KNEE Right 11/14/2019    Procedure: ARTHROSCOPY KNEE WITH MPFL REPAIR;  Surgeon: Elicia Beck MD;  Location: [address] [place] [address] OR;  Service: Orthopedics   • CYST REMOVAL      buttocks        Family History   Problem Relation Age of Onset   • No Known Problems Father      I have reviewed and agree with the history as documented  E-Cigarette/Vaping   • E-Cigarette Use Current Every Day User      E-Cigarette/Vaping Substances   • Nicotine No    • THC No    • CBD No    • Flavoring No    • Other No    • Unknown No      Social History     Tobacco Use   • Smoking status: Former Smoker     Packs/day: 0 20     Quit date: 8/27/2019     Years since quitting: 3 1   • Smokeless tobacco: Former User     Quit date: 7/11/2019   Vaping Use   • Vaping Use: Every day   Substance Use Topics   • Alcohol use: No   • Drug use: No       Review of Systems   Constitutional: Negative  Negative for chills, fatigue and fever  HENT: Negative  Negative for congestion, rhinorrhea and sore throat  Eyes: Negative  Respiratory: Negative  Negative for cough, shortness of breath and wheezing  Cardiovascular: Negative  Negative for chest pain  Gastrointestinal: Negative  Negative for abdominal pain, diarrhea, nausea and vomiting  Genitourinary: Negative  Negative for dysuria, flank pain and frequency  Musculoskeletal: Negative  Negative for back pain, myalgias and neck pain  Skin: Negative  Negative for rash  Neurological: Negative  Negative for dizziness, light-headedness, numbness and headaches  Psychiatric/Behavioral: Negative  All other systems reviewed and are negative  Physical Exam  Physical Exam  Vitals and nursing note reviewed  Constitutional:       General: He is not in acute distress  Appearance: Normal appearance  He is well-developed  He is morbidly obese  He is not toxic-appearing or diaphoretic  HENT:      Head: Normocephalic and atraumatic  Right Ear: Hearing and external ear normal       Left Ear: Hearing and external ear normal       Mouth/Throat:      Mouth: Mucous membranes are moist       Pharynx: Oropharynx is clear  Uvula midline  Eyes:      General: Lids are normal  No scleral icterus       Conjunctiva/sclera: Conjunctivae normal    Neck:      Trachea: Trachea and phonation normal  No tracheal deviation  Cardiovascular:      Rate and Rhythm: Normal rate and regular rhythm  Pulses: Normal pulses  Heart sounds: Normal heart sounds  Pulmonary:      Effort: Pulmonary effort is normal  No tachypnea or respiratory distress  Breath sounds: Normal breath sounds  Abdominal:      General: Bowel sounds are normal  There is no distension  Palpations: Abdomen is soft  Tenderness: There is no abdominal tenderness  There is no guarding or rebound  Comments: There is cloudy watery drainage noted within umbilicus but no wounds or rashes noted  There is no redness or cellulitic changes  Musculoskeletal:      Right lower leg: No edema  Left lower leg: No edema  Skin:     General: Skin is warm and dry  Capillary Refill: Capillary refill takes less than 2 seconds  Findings: No erythema, rash or wound  Nails: There is no clubbing  Neurological:      Mental Status: He is alert and oriented to person, place, and time  Sensory: No sensory deficit  Motor: No abnormal muscle tone        Gait: Gait normal    Psychiatric:         Mood and Affect: Mood normal          Speech: Speech normal          Behavior: Behavior normal          Vital Signs  ED Triage Vitals   Temperature Pulse Respirations Blood Pressure SpO2   10/24/22 1715 10/24/22 1715 10/24/22 1715 10/24/22 1715 10/24/22 1715   97 8 °F (36 6 °C) 82 18 (!) 173/87 98 %      Temp Source Heart Rate Source Patient Position - Orthostatic VS BP Location FiO2 (%)   10/24/22 1715 10/24/22 1715 10/24/22 1730 10/24/22 1730 --   Temporal Monitor Lying Right arm       Pain Score       10/24/22 1715       No Pain           Vitals:    10/24/22 1715 10/24/22 1730   BP: (!) 173/87 159/76   Pulse: 82 85   Patient Position - Orthostatic VS:  Lying         Visual Acuity      ED Medications  Medications - No data to display    Diagnostic Studies  Results Reviewed     Procedure Component Value Units Date/Time    Wound culture and Gram stain [[id number]] Collected: 10/24/22 1740    Lab Status: In process Specimen: Wound from Abdominal Updated: 10/24/22 1743                 No orders to display              Procedures  Procedures         ED Course  ED Course as of 10/24/22 2033   Mon Oct 24, 2022   1722 Reviewed prior OV - seen on 9/30/22 for cellulitis of umbilicus  Wound culture grew out 2+ beta hemolytic strep group F; was originally prescribed bactrim ds but switched to keflex  A repeat wound culture was obtained, however this does not appear bacterial in nature  Would recommend treating as a topical yeast infection  Advised to keep the belly button dry  Will Rx topical nystatin powder  Follow up on culture results and treat as indicated  BP mildly elevated  Pt asymptomatic  Advised to monitor and f/u with PCP  Reviewed lifestyle modifications  Strict return precautions outlined  Advised outpatient follow up with PCP or return to ER for change in condition as outlined  Pt verbalized understanding and had no further questions                                          MDM  Number of Diagnoses or Management Options  Elevated BP without diagnosis of hypertension: new and does not require workup  Umbilicus discharge: new and requires workup     Amount and/or Complexity of Data Reviewed  Clinical lab tests: ordered and reviewed  Decide to obtain previous medical records or to obtain history from someone other than the patient: yes  Obtain history from someone other than the patient: yes  Review and summarize past medical records: yes    Patient Progress  Patient progress: improved      Disposition  Final diagnoses:   Umbilicus discharge   Elevated BP without diagnosis of hypertension     Time reflects when diagnosis was documented in both MDM as applicable and the Disposition within this note     Time User Action Codes Description Comment    10/24/2022  5:32 PM Correne Bamberger Add [C15 1] Umbilicus discharge     85/52/9733  5:34 PM Sana More Add [R03 0] Elevated BP without diagnosis of hypertension       ED Disposition     ED Disposition   Discharge    Condition   Stable    Date/Time   Mon Oct 24, 2022  5:31 PM    [facility] discharge to home/self care                 Follow-up Information     Follow up With Specialties Details Why Contact Info Additional Information    [facility] Emergency Department Emergency Medicine  As needed [phone]  [facility] Emergency Department, [address], [address]          Discharge Medication List as of 10/24/2022  5:34 PM      START taking these medications    Details   nystatin (MYCOSTATIN) powder Apply topically 3 (three) times a day, Starting Mon 10/24/2022, Normal         CONTINUE these medications which have NOT CHANGED    Details   citalopram (CeleXA) 10 mg tablet Take 10 mg by mouth daily , Historical Med      clonazePAM (KlonoPIN) 1 mg tablet Take 1 mg by mouth daily at bedtime, Historical Med      fluticasone (FLONASE) 50 mcg/act nasal spray 1 spray into each nostril daily, Starting Sun 7/11/2021, Normal      !! Ibuprofen (MOTRIN PO) Take by mouth, Historical Med      !! ibuprofen (MOTRIN) 600 mg tablet Take 1 tablet (600 mg total) by mouth every 6 (six) hours as needed for moderate pain, Starting Mon 6/6/2022, Normal      ketorolac (TORADOL) 10 mg tablet Take 1 tablet (10 mg total) by mouth every 6 (six) hours as needed for severe pain, Starting Tue 9/22/2020, Normal      naproxen (NAPROSYN) 500 mg tablet Take 1 tablet (500 mg total) by mouth 2 (two) times a day with meals, Starting Thu 11/14/2019, Normal      Naproxen Sodium (ALEVE PO) Take by mouth, Historical Med      ondansetron (ZOFRAN) 4 mg tablet Take 1 tablet (4 mg total) by mouth every 8 (eight) hours as needed for nausea or vomiting, Starting Thu 11/14/2019, Normal      !! ondansetron (ZOFRAN-ODT) 4 mg disintegrating tablet Take 1 tablet (4 mg total) by mouth every 8 (eight) hours as needed for nausea or vomiting for up to 20 doses, Starting u 9/23/2021, Normal      !! ondansetron (ZOFRAN-ODT) 4 mg disintegrating tablet Take 1 tablet (4 mg total) by mouth every 6 (six) hours as needed for nausea or vomiting, Starting Mon 6/6/2022, Normal      !! ondansetron (ZOFRAN-ODT) 8 mg disintegrating tablet Take 1 tablet (8 mg total) by mouth every 8 (eight) hours as needed for nausea or vomiting, Starting Mon 2/10/2020, Normal      tamsulosin (FLOMAX) 0 4 mg Take 1 capsule (0 4 mg total) by mouth daily, Starting Mon 6/6/2022, Normal       !! - Potential duplicate medications found  Please discuss with provider  No discharge procedures on file      PDMP Review     None          ED Provider  Electronically Signed by           Chapis Bean PA-C  10/24/22 2033

## 2022-10-28 LAB
BACTERIA WND AEROBE CULT: ABNORMAL
BACTERIA WND AEROBE CULT: ABNORMAL
GRAM STN SPEC: ABNORMAL

## 2022-10-29 NOTE — PROGRESS NOTES
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  Speculum is some injury was a varus stress to the knee  He has a history of knee injury in the past   He felt his previous knee injury was to his right knee however there are no right knee x-rays in the system  There are left knee x-rays  He notes improvement in his pain since the injury but still has some discomfort and slight instability  Pain is improved with knee immobilizer  Review of Systems  Review of systems negative unless otherwise specified in HPI    Past Medical History  Past Medical History:   Diagnosis Date    Knee pain, right      Past Surgical History  Past Surgical History:   Procedure Laterality Date    CYST REMOVAL      buttocks      Current Medications  Current Outpatient Medications on File Prior to Visit   Medication Sig Dispense Refill    citalopram (CeleXA) 10 mg tablet Take 10 mg by mouth daily      clonazePAM (KlonoPIN) 1 mg tablet Take 1 mg by mouth daily at bedtime       No current facility-administered medications on file prior to visit        Recent Labs [facility])  0   Lab Value Date/Time    HCT 42 0 04/02/2019 2136    HCT 37 6 09/14/2015 0854    HGB 13 7 04/02/2019 2136    HGB 13 2 09/14/2015 0854    WBC 10 43 (H) 04/02/2019 2136    WBC 8 51 09/14/2015 0854    GLUCOSE 90 09/14/2015 0854    HGBA1C 5 6 03/29/2018 1202     Physical exam  · General: Awake, Alert, Oriented, BMI is 50 95  · Eyes: Pupils equal, round and reactive to light  · Heart: regular rate and rhythm  · Lungs: No audible wheezing  · Abdomen: soft  right [facility] Brief Note    Talked to patient regarding colonoscopy.  He is discharging today. Plans to have labs checked this week with his local PCP and is then moving to [place].  He states he will arrange for colonoscopy there within a few weeks.    I encouraged him to call our office if plans change and he stays in town and we can arrange colonoscopy here.    Brina Logan MD  [facility]  [phone]     Knee exam  · Trace effusion with slight tenderness over the medial joint line and MCL region  There is also tenderness over the medial tibial tubercle region  Range of motion is 0-90 degrees before start of pain  Equivocal Ancelmo's  Minimal translation with anterior drawer none with posterior drawer  Positive discomfort and slight laxity with MCL stress test none with LCL stress  Grossly neurovascular intact  Procedure  None    Imaging  I reviewed x-rays from the ER agree with radiologist's reading of accessory ossicle near the anterior tibial tuberosity however there is some lucency in this area as well  There is also sclerotic region of the distal femur consistent with likely benign fibro osseous lesion  1  Tear of MCL (medial collateral ligament) of knee, right, initial encounter    2  Foreign body (FB) in soft tissue      Assessment:  right knee MCL grade 1 tear with slight laxity to ACL trace effusion and accessory ossicle of the tibial tubercle and sclerotic benign fibroma distal femur  Plan: We will attempt to evaluate the knee with MRI evaluation however patient has a history of a BP subcutaneously near his right eye  X-rays are taken the office today and MRI was called and notified  Decision for MRI will be based on radiologist decision verses CT evaluation of the right knee  Patient should continue to wear the knee immobilizer when mobile  He does not need the crutches  He can remove the knee immobilizer for range of motion of the knee  Start him in physical therapy  Recheck with MRI versus CT results in 1 week  He may participate in band but no sports or gym

## 2022-11-16 ENCOUNTER — HOSPITAL ENCOUNTER (EMERGENCY)
Facility: HOSPITAL | Age: 22
Discharge: HOME | End: 2022-11-16
Attending: EMERGENCY MEDICINE

## 2022-11-16 VITALS
HEIGHT: 69 IN | DIASTOLIC BLOOD PRESSURE: 76 MMHG | SYSTOLIC BLOOD PRESSURE: 154 MMHG | BODY MASS INDEX: 58.69 KG/M2 | TEMPERATURE: 98.5 F | HEART RATE: 90 BPM | RESPIRATION RATE: 18 BRPM | OXYGEN SATURATION: 95 %

## 2022-11-16 DIAGNOSIS — R19.8 UMBILICAL DISCHARGE: Primary | ICD-10-CM

## 2022-11-16 LAB — GLUCOSE SERPL-MCNC: 85 MG/DL (ref 65–140)

## 2022-11-16 RX ORDER — CEPHALEXIN 250 MG/1
500 CAPSULE ORAL ONCE
Status: COMPLETED | OUTPATIENT
Start: 2022-11-16 | End: 2022-11-16

## 2022-11-16 RX ORDER — PENICILLIN V POTASSIUM 250 MG/1
500 TABLET ORAL ONCE
Status: DISCONTINUED | OUTPATIENT
Start: 2022-11-16 | End: 2022-11-16

## 2022-11-16 RX ORDER — SULFAMETHOXAZOLE AND TRIMETHOPRIM 800; 160 MG/1; MG/1
1 TABLET ORAL 2 TIMES DAILY
Qty: 14 TABLET | Refills: 0 | Status: SHIPPED | OUTPATIENT
Start: 2022-11-16 | End: 2022-11-23

## 2022-11-16 RX ORDER — SULFAMETHOXAZOLE AND TRIMETHOPRIM 800; 160 MG/1; MG/1
1 TABLET ORAL ONCE
Status: COMPLETED | OUTPATIENT
Start: 2022-11-16 | End: 2022-11-16

## 2022-11-16 RX ORDER — CLOTRIMAZOLE 1 %
CREAM (GRAM) TOPICAL
Qty: 15 G | Refills: 0 | Status: SHIPPED | OUTPATIENT
Start: 2022-11-16

## 2022-11-16 RX ORDER — CEPHALEXIN 500 MG/1
500 CAPSULE ORAL EVERY 8 HOURS SCHEDULED
Qty: 21 CAPSULE | Refills: 0 | Status: SHIPPED | OUTPATIENT
Start: 2022-11-16 | End: 2022-11-23

## 2022-11-16 RX ADMIN — SULFAMETHOXAZOLE AND TRIMETHOPRIM 1 TABLET: 800; 160 TABLET ORAL at 20:38

## 2022-11-16 RX ADMIN — CEPHALEXIN 500 MG: 250 CAPSULE ORAL at 20:37

## 2022-11-17 NOTE — ED PROVIDER NOTES
History  Chief Complaint   Patient presents with   • Skin Irritation     Skin irritation around his belly button area  States it started around 3 months ago  Was given and antibiotic, and powder in the past, but still has not gone away  25year old male with PMH anxiety/depression, obesity presents for evaluation of drainage from his belly button  He reports he's had some issues over the past few months  Was seen previously at an urgent care center at end of September and placed on antibiotics but states it was unchanged  He notes reddened irritation within the belly button  Still has drainage which he notes is purulent in nature and foul smelling  He notes the area is itchy at times  No reported aggravating or alleviating factors  Denies fever, chills, cough, congestion or recent illness  Denies chest pain, SOB, N/V/D/C, abdominal pain  Denies headache, dizziness, lightheadedness, visual disturbance  Denies any urinary complaints such as dysuria, frequency, urgency, hematuria  Denies flank or back pain  No other rashes or wounds reported  Pt denies h/o diabetes  He was previously seen here after his urgent care visit  Was treated with nystatin without relief  History provided by:  Patient and medical records   used: No        Prior to Admission Medications   Prescriptions Last Dose Informant Patient Reported? Taking?    Ibuprofen (MOTRIN PO)   Yes No   Sig: Take by mouth   Patient not taking: No sig reported   Naproxen Sodium (ALEVE PO)   Yes No   Sig: Take by mouth   Patient not taking: No sig reported   citalopram (CeleXA) 10 mg tablet   Yes No   Sig: Take 10 mg by mouth daily    Patient not taking: No sig reported   clonazePAM (KlonoPIN) 1 mg tablet   Yes No   Sig: Take 1 mg by mouth daily at bedtime   Patient not taking: No sig reported   fluticasone (FLONASE) 50 mcg/act nasal spray   No No   Si spray into each nostril daily   Patient not taking: No sig reported ibuprofen (MOTRIN) 600 mg tablet   No No   Sig: Take 1 tablet (600 mg total) by mouth every 6 (six) hours as needed for moderate pain   Patient not taking: Reported on 9/30/2022   ketorolac (TORADOL) 10 mg tablet   No No   Sig: Take 1 tablet (10 mg total) by mouth every 6 (six) hours as needed for severe pain   Patient not taking: No sig reported   naproxen (NAPROSYN) 500 mg tablet   No No   Sig: Take 1 tablet (500 mg total) by mouth 2 (two) times a day with meals   Patient not taking: No sig reported   nystatin (MYCOSTATIN) powder   No No   Sig: Apply topically 3 (three) times a day   ondansetron (ZOFRAN) 4 mg tablet   No No   Sig: Take 1 tablet (4 mg total) by mouth every 8 (eight) hours as needed for nausea or vomiting   Patient not taking: No sig reported   ondansetron (ZOFRAN-ODT) 4 mg disintegrating tablet   No No   Sig: Take 1 tablet (4 mg total) by mouth every 8 (eight) hours as needed for nausea or vomiting for up to 20 doses   Patient not taking: No sig reported   ondansetron (ZOFRAN-ODT) 4 mg disintegrating tablet   No No   Sig: Take 1 tablet (4 mg total) by mouth every 6 (six) hours as needed for nausea or vomiting   Patient not taking: Reported on 9/30/2022   ondansetron (ZOFRAN-ODT) 8 mg disintegrating tablet   No No   Sig: Take 1 tablet (8 mg total) by mouth every 8 (eight) hours as needed for nausea or vomiting   Patient not taking: No sig reported   tamsulosin (FLOMAX) 0 4 mg   No No   Sig: Take 1 capsule (0 4 mg total) by mouth daily   Patient not taking: No sig reported      Facility-Administered Medications: None       Past Medical History:   Diagnosis Date   • Anxiety    • Depression    • Knee pain, right    • Obesity        Past Surgical History:   Procedure Laterality Date   • ARTHROSCOPY KNEE Right 11/14/2019    Procedure: ARTHROSCOPY KNEE WITH MPFL REPAIR;  Surgeon: Len Ohara MD;  Location: [address] [place] [address];  Service: Orthopedics   • CYST REMOVAL      buttocks        Family History   Problem Relation Age of Onset   • No Known Problems Father      I have reviewed and agree with the history as documented  E-Cigarette/Vaping   • E-Cigarette Use Current Every Day User      E-Cigarette/Vaping Substances   • Nicotine No    • THC No    • CBD No    • Flavoring No    • Other No    • Unknown No      Social History     Tobacco Use   • Smoking status: Former     Packs/day: 0 20     Types: Cigarettes     Quit date: 8/27/2019     Years since quitting: 3 2   • Smokeless tobacco: Former     Quit date: 7/11/2019   Vaping Use   • Vaping Use: Every day   Substance Use Topics   • Alcohol use: No   • Drug use: No       Review of Systems   Constitutional: Negative  Negative for chills, fatigue and fever  HENT: Negative  Negative for congestion, rhinorrhea and sore throat  Eyes: Negative  Negative for visual disturbance  Respiratory: Negative  Negative for cough, shortness of breath and wheezing  Cardiovascular: Negative  Negative for chest pain, palpitations and leg swelling  Gastrointestinal: Negative  Negative for abdominal pain, constipation, diarrhea, nausea and vomiting  Genitourinary: Negative  Negative for dysuria, flank pain, frequency and hematuria  Musculoskeletal: Negative  Negative for back pain, myalgias and neck pain  Skin: Negative for rash  Neurological: Negative  Negative for dizziness, light-headedness, numbness and headaches  Psychiatric/Behavioral: Negative  All other systems reviewed and are negative  Physical Exam  Physical Exam  Vitals and nursing note reviewed  Constitutional:       General: He is not in acute distress  Appearance: Normal appearance  He is well-developed and well-nourished  He is obese  He is not toxic-appearing or diaphoretic  HENT:      Head: Normocephalic and atraumatic        Right Ear: Hearing and external ear normal       Left Ear: Hearing and external ear normal       Mouth/Throat:      Mouth: Oropharynx is clear and moist and mucous membranes are normal  Mucous membranes are moist       Pharynx: Oropharynx is clear  Eyes:      General: Lids are normal  No scleral icterus  Extraocular Movements: EOM normal       Conjunctiva/sclera: Conjunctivae normal    Neck:      Trachea: Trachea and phonation normal    Cardiovascular:      Rate and Rhythm: Normal rate and regular rhythm  Pulses: Normal pulses  Heart sounds: S1 normal    Pulmonary:      Effort: Pulmonary effort is normal  No tachypnea or respiratory distress  Abdominal:      General: Bowel sounds are normal  There is no distension  Palpations: Abdomen is soft  There is no hepatosplenomegaly  Tenderness: There is no abdominal tenderness  There is no CVA tenderness, guarding or rebound  Comments: Has some irritationwith drainage/moistness within the umbilicus  No open wounds  No palpable collections  Musculoskeletal:         General: No edema  Right lower leg: No edema  Left lower leg: No edema  Skin:     General: Skin is warm and dry  Capillary Refill: Capillary refill takes less than 2 seconds  Findings: Erythema present  No rash  Nails: There is no cyanosis  Neurological:      Mental Status: He is alert and oriented to person, place, and time        Gait: Gait normal    Psychiatric:         Mood and Affect: Mood and affect and mood normal          Speech: Speech normal          Behavior: Behavior normal          Vital Signs  ED Triage Vitals   Temperature Pulse Respirations Blood Pressure SpO2   11/16/22 1914 11/16/22 1914 11/16/22 1914 11/16/22 1916 11/16/22 1914   98 5 °F (36 9 °C) 83 18 161/69 96 %      Temp Source Heart Rate Source Patient Position - Orthostatic VS BP Location FiO2 (%)   11/16/22 1914 11/16/22 1914 11/16/22 1914 11/16/22 1914 --   Temporal Monitor Sitting Right arm       Pain Score       11/16/22 1914       No Pain           Vitals:    11/16/22 1914 11/16/22 1916 11/16/22 2030   BP:  161/69 154/76 Pulse: 83  90   Patient Position - Orthostatic VS: Sitting           Visual Acuity      ED Medications  Medications   sulfamethoxazole-trimethoprim (BACTRIM DS) 800-160 mg per tablet 1 tablet (1 tablet Oral Given 11/16/22 2038)   cephalexin (KEFLEX) capsule 500 mg (500 mg Oral Given 11/16/22 2037)       Diagnostic Studies  Results Reviewed     Procedure Component Value Units Date/Time    Fingerstick Glucose (POCT) [[id number]]  (Normal) Collected: 11/16/22 2017    Lab Status: Final result Updated: 11/16/22 2018     POC Glucose 85 mg/dl                  No orders to display              Procedures  Procedures         ED Course  ED Course as of 11/16/22 2134   Wed Nov 16, 2022 2006 Prior records reviewed  Was seen here on 10/24/22 for drainage from his umbilicus  Was treated with nystatin powder  Prior to that he was seen at urgent care and treated with oral antibiotics  Culture from prior visit grew out beta hemolytic strep group F    2024 POC Glucose: 85     Pt afebrile, well appearing  Does not appear systemically ill  Abdomen soft and non tender  Abx as directed - will place on keflex and bactrim; prior culture grew out group F strep  I still have concern a potential fungal etiology - will also prescribe clotrimazole  Recommend recheck with PCP  POCT glucose was within normal limits and not suggestive of diabetes  Discussed continued symptomatic/supportive care  Advised rest, fluids, OTC meds as needed for symptoms  Strict return precautions outlined  Advised outpatient follow up with PCP or return to ER for change in condition as outlined  Pt verbalized understanding and had no further questions                                              MDM  Number of Diagnoses or Management Options     Amount and/or Complexity of Data Reviewed  Clinical lab tests: reviewed  Decide to obtain previous medical records or to obtain history from someone other than the patient: yes  Obtain history from someone other than the patient: yes  Review and summarize past medical records: yes    Patient Progress  Patient progress: improved      Disposition  Final diagnoses:   Umbilical discharge     Time reflects when diagnosis was documented in both MDM as applicable and the Disposition within this note     Time User Action Codes Description Comment    11/16/2022  8:28 PM Adri Kirkland Add [Q26 3] Umbilical discharge       ED Disposition     ED Disposition   Discharge    Condition   Stable    Date/Time   Wed Nov 16, 2022  8:28 PM    Comment   Darlynn Gosselin discharge to home/self care                 Follow-up Information     Follow up With Specialties Details Why Contact Info Additional Information    Jose Rivas MD Pediatrics Schedule an appointment as soon as possible for a visit   [address] [place] [address]  [address]  [address] Emergency Department Emergency Medicine  As needed [place] [phone]  [facility], [address]          Discharge Medication List as of 11/16/2022  8:31 PM      START taking these medications    Details   cephalexin (KEFLEX) 500 mg capsule Take 1 capsule (500 mg total) by mouth every 8 (eight) hours for 7 days, Starting Wed 11/16/2022, Until Wed 11/23/2022, Normal      clotrimazole (LOTRIMIN) 1 % cream Apply to affected area 2 times daily x 2 weeks, Normal      sulfamethoxazole-trimethoprim (BACTRIM DS) 800-160 mg per tablet Take 1 tablet by mouth 2 (two) times a day for 7 days smx-tmp DS (BACTRIM) 800-160 mg tabs (1tab q12 D10), Starting Wed 11/16/2022, Until Wed 11/23/2022, Normal         CONTINUE these medications which have NOT CHANGED    Details   citalopram (CeleXA) 10 mg tablet Take 10 mg by mouth daily , Historical Med      clonazePAM (KlonoPIN) 1 mg tablet Take 1 mg by mouth daily at bedtime, Historical Med fluticasone (FLONASE) 50 mcg/act nasal spray 1 spray into each nostril daily, Starting Sun 7/11/2021, Normal      !! Ibuprofen (MOTRIN PO) Take by mouth, Historical Med      !! ibuprofen (MOTRIN) 600 mg tablet Take 1 tablet (600 mg total) by mouth every 6 (six) hours as needed for moderate pain, Starting Mon 6/6/2022, Normal      ketorolac (TORADOL) 10 mg tablet Take 1 tablet (10 mg total) by mouth every 6 (six) hours as needed for severe pain, Starting Tue 9/22/2020, Normal      naproxen (NAPROSYN) 500 mg tablet Take 1 tablet (500 mg total) by mouth 2 (two) times a day with meals, Starting Thu 11/14/2019, Normal      Naproxen Sodium (ALEVE PO) Take by mouth, Historical Med      nystatin (MYCOSTATIN) powder Apply topically 3 (three) times a day, Starting Mon 10/24/2022, Normal      ondansetron (ZOFRAN) 4 mg tablet Take 1 tablet (4 mg total) by mouth every 8 (eight) hours as needed for nausea or vomiting, Starting Thu 11/14/2019, Normal      !! ondansetron (ZOFRAN-ODT) 4 mg disintegrating tablet Take 1 tablet (4 mg total) by mouth every 8 (eight) hours as needed for nausea or vomiting for up to 20 doses, Starting Thu 9/23/2021, Normal      !! ondansetron (ZOFRAN-ODT) 4 mg disintegrating tablet Take 1 tablet (4 mg total) by mouth every 6 (six) hours as needed for nausea or vomiting, Starting Mon 6/6/2022, Normal      !! ondansetron (ZOFRAN-ODT) 8 mg disintegrating tablet Take 1 tablet (8 mg total) by mouth every 8 (eight) hours as needed for nausea or vomiting, Starting Mon 2/10/2020, Normal      tamsulosin (FLOMAX) 0 4 mg Take 1 capsule (0 4 mg total) by mouth daily, Starting Mon 6/6/2022, Normal       !! - Potential duplicate medications found  Please discuss with provider  No discharge procedures on file      PDMP Review     None          ED Provider  Electronically Signed by           Ousmane Mendoza PA-C  11/16/22 8167

## 2022-11-17 NOTE — DISCHARGE INSTRUCTIONS
Take antibiotics as directed for the full duration  Use the topical cream on affected area  Follow up with PCP for recheck or return to ER as needed

## 2023-07-31 ENCOUNTER — OFFICE VISIT (OUTPATIENT)
Dept: URGENT CARE | Facility: MEDICAL CENTER | Age: 23
End: 2023-07-31
Payer: COMMERCIAL

## 2023-07-31 VITALS
BODY MASS INDEX: 46.65 KG/M2 | HEIGHT: 69 IN | TEMPERATURE: 98.1 F | DIASTOLIC BLOOD PRESSURE: 84 MMHG | RESPIRATION RATE: 20 BRPM | OXYGEN SATURATION: 98 % | HEART RATE: 83 BPM | SYSTOLIC BLOOD PRESSURE: 110 MMHG | WEIGHT: 315 LBS

## 2023-07-31 DIAGNOSIS — J02.9 SORE THROAT: Primary | ICD-10-CM

## 2023-07-31 LAB — S PYO AG THROAT QL: NEGATIVE

## 2023-07-31 PROCEDURE — S9088 SERVICES PROVIDED IN URGENT: HCPCS

## 2023-07-31 PROCEDURE — 87070 CULTURE OTHR SPECIMN AEROBIC: CPT

## 2023-07-31 PROCEDURE — 87880 STREP A ASSAY W/OPTIC: CPT

## 2023-07-31 PROCEDURE — 99212 OFFICE O/P EST SF 10 MIN: CPT

## 2023-07-31 RX ORDER — FLUOXETINE HYDROCHLORIDE 20 MG/1
20 CAPSULE ORAL DAILY
COMMUNITY

## 2023-07-31 NOTE — PATIENT INSTRUCTIONS
You may take over the counter Tylenol (Acetaminophen) and/or Motrin (Ibuprofen) as needed, as directed on packaging. Be sure to get plenty of rest, and drinking fluids to remain hydrated. You had testing performed here today which will be sent out for results. You can see your results in your MyChart if you have one, and you will also be contacted by the office if any treatments are needed or anything need to change with your care.

## 2023-07-31 NOTE — PROGRESS NOTES
[facility]        NAME: Ingrid Cesar is a 26 y.o. male  : 2000    MRN: [id number]  DATE: 2023  TIME: 2:38 PM    Assessment and Plan   Sore throat [J02.9]  1. Sore throat  POCT rapid strepA    Throat culture            Patient Instructions       Follow up with PCP in 3-5 days. Proceed to  ER if symptoms worsen. Chief Complaint     Chief Complaint   Patient presents with   • Sore Throat     Started last night. Did strep test at work and was negative last pm. Today much worse. History of Present Illness       Patient started last night with sore throat. He was eating pop-corn and though maybe he had irritation from that but wanted to be checked. Denies any other symptoms. Discomfort with swallowing but denies any feeling of throat closure. No difficulty with opening mouth or pain in jaw. Sore Throat   This is a new problem. The current episode started yesterday. The problem has been gradually worsening. The pain is worse on the right side. There has been no fever. Pertinent negatives include no abdominal pain, congestion, coughing, diarrhea, drooling, ear discharge, ear pain, headaches, hoarse voice, plugged ear sensation, neck pain, shortness of breath, stridor, swollen glands, trouble swallowing or vomiting. He has had no exposure to strep or mono. He has tried NSAIDs for the symptoms. The treatment provided no relief. Review of Systems   Review of Systems   HENT: Positive for sore throat. Negative for congestion, drooling, ear discharge, ear pain, hoarse voice and trouble swallowing. Respiratory: Negative for cough, shortness of breath and stridor. Gastrointestinal: Negative for abdominal pain, diarrhea and vomiting. Musculoskeletal: Negative for neck pain. Neurological: Negative for headaches.          Current Medications       Current Outpatient Medications:   •  FLUoxetine (PROzac) 20 mg capsule, Take 20 mg by mouth daily, Disp: , Rfl:     Current Allergies     Allergies as of 07/31/2023   • (No Known Allergies)            The following portions of the patient's history were reviewed and updated as appropriate: allergies, current medications, past family history, past medical history, past social history, past surgical history and problem list.     Past Medical History:   Diagnosis Date   • Anxiety    • Depression    • Knee pain, right    • Obesity        Past Surgical History:   Procedure Laterality Date   • ARTHROSCOPY KNEE Right 11/14/2019    Procedure: ARTHROSCOPY KNEE WITH MPFL REPAIR;  Surgeon: Rommel Bryant MD;  Location: [address] [place] [address] OR;  Service: Orthopedics   • CYST REMOVAL      buttocks        Family History   Problem Relation Age of Onset   • No Known Problems Father          Medications have been verified. Objective   /84   Pulse 83   Temp 98.1 °F (36.7 °C)   Resp 20   Ht 5' 9" (1.753 m)   Wt (!) 172 kg (380 lb)   SpO2 98%   BMI 56.12 kg/m²        Physical Exam     Physical Exam  Vitals and nursing note reviewed. Constitutional:       General: He is not in acute distress. Appearance: Normal appearance. He is normal weight. He is not ill-appearing. HENT:      Head: Normocephalic and atraumatic. Right Ear: Tympanic membrane, ear canal and external ear normal.      Left Ear: Tympanic membrane, ear canal and external ear normal.      Nose: Nose normal.      Mouth/Throat:      Lips: Pink. Mouth: Mucous membranes are moist.      Pharynx: Uvula midline. Pharyngeal swelling and posterior oropharyngeal erythema present. No oropharyngeal exudate or uvula swelling. Tonsils: Tonsillar exudate present. No tonsillar abscesses. 0 on the right. 0 on the left. Eyes:      Extraocular Movements: Extraocular movements intact. Conjunctiva/sclera: Conjunctivae normal.      Pupils: Pupils are equal, round, and reactive to light. Cardiovascular:      Rate and Rhythm: Normal rate and regular rhythm.       Pulses: Normal pulses. Heart sounds: Normal heart sounds. Pulmonary:      Effort: Pulmonary effort is normal.      Breath sounds: Normal breath sounds. Abdominal:      General: Abdomen is flat. Bowel sounds are normal.      Palpations: Abdomen is soft. Musculoskeletal:         General: Normal range of motion. Cervical back: Normal range of motion and neck supple. Skin:     General: Skin is warm. Capillary Refill: Capillary refill takes less than 2 seconds. Neurological:      General: No focal deficit present. Mental Status: He is alert and oriented to person, place, and time.    Psychiatric:         Mood and Affect: Mood normal.         Behavior: Behavior normal.

## 2023-08-02 LAB — BACTERIA THROAT CULT: NORMAL

## 2023-08-14 ENCOUNTER — OFFICE VISIT (OUTPATIENT)
Dept: PODIATRY | Facility: CLINIC | Age: 23
End: 2023-08-14
Payer: COMMERCIAL

## 2023-08-14 DIAGNOSIS — L03.031 CELLULITIS OF TOE OF RIGHT FOOT: ICD-10-CM

## 2023-08-14 DIAGNOSIS — L60.0 INGROWING NAIL: Primary | ICD-10-CM

## 2023-08-14 DIAGNOSIS — M79.674 PAIN OF TOE OF RIGHT FOOT: ICD-10-CM

## 2023-08-14 PROCEDURE — 11750 EXCISION NAIL&NAIL MATRIX: CPT | Performed by: PODIATRIST

## 2023-08-14 PROCEDURE — 99203 OFFICE O/P NEW LOW 30 MIN: CPT | Performed by: PODIATRIST

## 2023-08-14 RX ORDER — CEPHALEXIN 500 MG/1
500 CAPSULE ORAL EVERY 6 HOURS SCHEDULED
Qty: 28 CAPSULE | Refills: 0 | Status: SHIPPED | OUTPATIENT
Start: 2023-08-14 | End: 2023-08-21

## 2023-08-14 RX ORDER — LIDOCAINE HYDROCHLORIDE 10 MG/ML
3 INJECTION, SOLUTION EPIDURAL; INFILTRATION; INTRACAUDAL; PERINEURAL ONCE
Status: COMPLETED | OUTPATIENT
Start: 2023-08-14 | End: 2023-08-14

## 2023-08-14 RX ADMIN — LIDOCAINE HYDROCHLORIDE 3 ML: 10 INJECTION, SOLUTION EPIDURAL; INFILTRATION; INTRACAUDAL; PERINEURAL at 16:00

## 2023-08-14 NOTE — PATIENT INSTRUCTIONS
Ingrown Nail   WHAT YOU NEED TO KNOW:   An ingrown nail is when the edge of your fingernail or toenail grows into the skin next to it. The most common cause is when nails are trimmed too short. DISCHARGE INSTRUCTIONS:   Return to the emergency department if:   You have a red streak running up your leg or arm. Contact your healthcare provider if:   Your pain is getting worse. Your nail and skin are more swollen or start to drain pus. You have a fever or chills. Your ingrown nail is not better in 7 days. You have questions or concerns about your condition or care. Medicines:   Acetaminophen  decreases pain and can be bought without a doctor's order. Ask how much to take and how often to take it. Follow directions. Acetaminophen can cause liver damage if not taken correctly. NSAIDs , such as ibuprofen, help decrease swelling, pain, and fever. This medicine is available with or without a doctor's order. NSAIDs can cause stomach bleeding or kidney problems in certain people. If you take blood thinner medicine, always ask your healthcare provider if NSAIDs are safe for you. Always read the medicine label and follow directions. Antibiotics  help treat or prevent a bacterial infection. They may be given as an ointment, pill, or both. Take your medicine as directed. Contact your healthcare provider if you think your medicine is not helping or if you have side effects. Tell your provider if you are allergic to any medicine. Keep a list of the medicines, vitamins, and herbs you take. Include the amounts, and when and why you take them. Bring the list or the pill bottles to follow-up visits. Carry your medicine list with you in case of an emergency. Self-care:   Soak and lift the nail. Soak your ingrown nail in warm water for 20 minutes, 2 to 3 times each day. Then gently lift the edge of the ingrown nail away from the skin.  Wedge a small piece of cotton or gauze under the corner of the nail. You can also put dental floss under the nail to lift the edge away from the skin. This may help keep the nail from growing into the skin. Keep your nails clean and dry. Wash your hands and feet with soap and water. Pat dry with a clean towel. Dry in between each toe. Do not put lotion between your toes. Prevent another ingrown nail:   Carefully trim your nails. Cut your nails straight across. Do not cut them too short. Lightly file the nail corners if you have sharp edges. Do not round your nails. Do not rip or tear off the tips of your nails. This may cause your nail edge to grow into the skin. Use clippers, not nail scissors. Wear shoes and socks that fit well. Make sure they are not too tight. You may need to wear a shoe with the toe cut out, such as sandals, until your ingrown toenail heals. Do not wear shoes that have pointed toes or heels that are more than 2 inches high. Do not wear tight hose or socks. Wear socks that pull moisture away from your feet, such as cotton-acrylic blends. Inspect your nails daily. Look for signs of an ingrown nail. Manage problems early so the nail does not become infected. Follow up with your healthcare provider as directed: You may be referred to a podiatrist. Write down your questions so you remember to ask them during your visits. © Copyright [employer] 2022 Information is for End User's use only and may not be sold, redistributed or otherwise used for commercial purposes. The above information is an  only. It is not intended as medical advice for individual conditions or treatments. Talk to your doctor, nurse or pharmacist before following any medical regimen to see if it is safe and effective for you.

## 2023-08-14 NOTE — PROGRESS NOTES
Podiatry - Clinic Visit  Shadi Paul 26 y.o. male MRN: [id number]    Assessment/Plan    No problem-specific Assessment & Plan notes found for this encounter. Diagnoses and all orders for this visit:    Ingrowing nail    Pain of toe of right foot    Cellulitis of toe of right foot         Plan:  - Pt seen/examined  - Please see procedure note  - Instructed pt to remove bandage tomorrow and start epsom salt soak baths  - Instructed to apply triple antibiotic ointment, and cover with a band-aid after soaks daily. - rtc 2 week    Nail removal    Date/Time: 8/14/2023 2:45 PM    Performed by: Clint Oneill DPM  Authorized by: Clint Oneill DPM  Universal Protocol:  Consent: Verbal consent obtained. Risks and benefits: risks, benefits and alternatives were discussed  Consent given by: patient  Patient understanding: patient states understanding of the procedure being performed  Patient consent: the patient's understanding of the procedure matches consent given  Patient identity confirmed: verbally with patient      Location:     Foot:  R big toe  Pre-procedure details:     Skin preparation:  Alcohol and Betadine  Anesthesia (see MAR for exact dosages): Anesthesia method:  Local infiltration  Nail Removal:     Nail removed:  Partial    Nail side:  Lateral  Ingrown nail:     Nail matrix removed or ablated:  Partial  Post-procedure details:     Dressing:  4x4 sterile gauze, antibiotic ointment and gauze roll    Patient tolerance of procedure: Tolerated well, no immediate complications        History of Present Illness     HPI:  Patient presents with pain to the Laterally: right toe. This has been present for weeks. They have experienced redness, swelling, increased pain and discharge. They have attempted wider shoes without success. They are having continued pain and drainage. They have attempted antibiotics and have taken augmentin.        Review of Systems   Constitutional: Negative. HENT: Negative. Eyes: Negative. Respiratory: Negative. Cardiovascular: Negative. Gastrointestinal: Negative. Musculoskeletal: neg   Skin: ingrowing nail. Neurological: Negative. Historical Information   Past Medical History:   Diagnosis Date   • Anxiety    • Depression    • Knee pain, right    • Obesity      Past Surgical History:   Procedure Laterality Date   • ARTHROSCOPY KNEE Right 11/14/2019    Procedure: ARTHROSCOPY KNEE WITH MPFL REPAIR;  Surgeon: Sandra Hudson MD;  Location: [address] [place] [address] MAIN OR;  Service: Orthopedics   • CYST REMOVAL      buttocks      Social History   Social History     Substance and Sexual Activity   Alcohol Use No     Social History     Substance and Sexual Activity   Drug Use No     Social History     Tobacco Use   Smoking Status Former   • Packs/day: 0.20   • Types: Cigarettes   • Quit date: 8/27/2019   • Years since quitting: 3.9   Smokeless Tobacco Former   • Quit date: 7/11/2019     Family History:   Family History   Problem Relation Age of Onset   • No Known Problems Father        Meds/Allergies   (Not in a hospital admission)    No Known Allergies    Objective   First Vitals:   @VSFIRST2(5,8,6,7,9,11,14,10:FIRST)@    Current Vitals: There were no vitals taken for this visit. General Appearance:    Alert, cooperative, no distress    Constitutional: Patient is not distressed. Patient is well developed. Patient is  obese. Extremities:   MMT is 5/5 to all compartments of the LE, +0/4 edema B/L, Digital ROM is intact, Pain on palpation of hallux nail. Normal range of motion to ankle, subtalar joint, and midtarsal joint. Normal range of motion first MTPJ. Manual muscle testing 5 out of 5 for inversion/eversion/dorsiflexion/plantarflexion. On stance patients feet are generally rectus. Pulses:   R DP is +2/4, R PT is +2/4, L DP is +2/4, L PT is +2/4, CFT< 3sec to all digits. No erythema. No edema. No significant varicosities. Skin:   Ingrowing nail to lateral border of right hallux nail. Moderate serosanguinous with cellulitis to the nail fold drainage. Dermatology: No rash. No open lesions. Present pedal hair. Skin has healthy turgor. Neurological: Monofilament sensation is intact. Vibratory sensation is intact. Achilles reflex is normal.   Proprioception is normal    Respiratory: Normal respiratory effort, no distress    Psych: Patient is AAOx3. Normal mood.      Lymphatic: nonpalpable popliteal lymph nodes  Nonpalpable groin lymph nodes

## 2024-06-07 ENCOUNTER — HOSPITAL ENCOUNTER (EMERGENCY)
Facility: HOSPITAL | Age: 24
Discharge: HOME | End: 2024-06-07
Attending: EMERGENCY MEDICINE
Payer: COMMERCIAL

## 2024-06-07 VITALS
SYSTOLIC BLOOD PRESSURE: 146 MMHG | DIASTOLIC BLOOD PRESSURE: 68 MMHG | TEMPERATURE: 97.7 F | RESPIRATION RATE: 18 BRPM | OXYGEN SATURATION: 96 % | HEART RATE: 95 BPM

## 2024-06-07 DIAGNOSIS — L02.91 ABSCESS: Primary | ICD-10-CM

## 2024-06-07 PROCEDURE — 99282 EMERGENCY DEPT VISIT SF MDM: CPT

## 2024-06-07 PROCEDURE — 99284 EMERGENCY DEPT VISIT MOD MDM: CPT | Performed by: EMERGENCY MEDICINE

## 2024-06-07 PROCEDURE — 10061 I&D ABSCESS COMP/MULTIPLE: CPT | Performed by: EMERGENCY MEDICINE

## 2024-06-07 RX ORDER — AMOXICILLIN AND CLAVULANATE POTASSIUM 875; 125 MG/1; MG/1
1 TABLET, FILM COATED ORAL 2 TIMES DAILY
Qty: 14 TABLET | Refills: 0 | Status: SHIPPED | OUTPATIENT
Start: 2024-06-07 | End: 2024-06-14

## 2024-06-07 RX ORDER — AMOXICILLIN AND CLAVULANATE POTASSIUM 875; 125 MG/1; MG/1
1 TABLET, FILM COATED ORAL ONCE
Status: COMPLETED | OUTPATIENT
Start: 2024-06-07 | End: 2024-06-07

## 2024-06-07 RX ORDER — LIDOCAINE HYDROCHLORIDE AND EPINEPHRINE 10; 10 MG/ML; UG/ML
5 INJECTION, SOLUTION INFILTRATION; PERINEURAL ONCE
Status: COMPLETED | OUTPATIENT
Start: 2024-06-07 | End: 2024-06-07

## 2024-06-07 RX ADMIN — LIDOCAINE HYDROCHLORIDE,EPINEPHRINE BITARTRATE 5 ML: 10; .01 INJECTION, SOLUTION INFILTRATION; PERINEURAL at 16:08

## 2024-06-07 RX ADMIN — AMOXICILLIN AND CLAVULANATE POTASSIUM 1 TABLET: 875; 125 TABLET, FILM COATED ORAL at 16:04

## 2024-06-07 NOTE — DISCHARGE INSTRUCTIONS
Finish 1 week of antibiotics  Probiotic over the counter to help with any diarrhea  Showering OK no swimming lake  water hot tubs  Warm soap and water to the area  Tylenol 650mg every 6 hours as needed for pain, fever (max 3000mg in 24 hours)   Aleve 2 tabs twice daily with food OR ibuprofen 200-800mg every 8 hours with food as needed for pain   Return with increased redness pus drainage increasing pain any difficulties with bowel movements abdominal pain urinary difficulties

## 2024-06-07 NOTE — ED PROVIDER NOTES
History  Chief Complaint   Patient presents with    Cyst     States that he has a cyst between buttocksthat needs to be drained      23 yo male history of anxiety and depression and has had recurrent abscesses including a pilonidal cyst patient states he had increasing pain and discomfort and some mild drainage a couple of days ago from an area near his prior pilonidal cyst drainage.  He has followed up with surgery in the past to excise the sac but it would require of wound wound VAC and long  postoperative course which he was not able to make to do so he postponed definitive treatment of his prior pilonidal cyst.  Patient states this area of discomfort is near the pilonidal cyst region just to the right.  He is otherwise had no difficulty with bowel movements no abdominal pain no fever chills he does not feel as if he has the flu has had no myalgias he had no scrotal erythema or edema.  He is otherwise tolerating a diet.  Last tetanus 3/24/2024  Surgical history includes right knee arthroplasty and prior drainage of pilonidal cyst        Prior to Admission Medications   Prescriptions Last Dose Informant Patient Reported? Taking?   FLUoxetine (PROzac) 20 mg capsule   Yes No   Sig: Take 20 mg by mouth daily   Fluoxetine HCl, PMDD, 20 MG TABS   Yes No   Sig: Take 20 mg by mouth daily   amoxicillin-clavulanate (AUGMENTIN) 875-125 mg per tablet   Yes No   Sig: Take 1 tablet by mouth 2 (two) times a day   oxyCODONE-acetaminophen (PERCOCET) 5-325 mg per tablet   Yes No   Sig: Take 1 tablet by mouth every 6 (six) hours as needed      Facility-Administered Medications: None       Past Medical History:   Diagnosis Date    Anxiety     Depression     Knee pain, right     Obesity        Past Surgical History:   Procedure Laterality Date    ARTHROSCOPY KNEE Right 11/14/2019    Procedure: ARTHROSCOPY KNEE WITH MPFL REPAIR;  Surgeon: Beba Sibley MD;  Location:  MAIN OR;  Service: Orthopedics    CYST REMOVAL      buttocks         Family History   Problem Relation Age of Onset    No Known Problems Father      I have reviewed and agree with the history as documented.    E-Cigarette/Vaping    E-Cigarette Use Current Every Day User      E-Cigarette/Vaping Substances    Nicotine No     THC No     CBD No     Flavoring No     Other No     Unknown No      Social History     Tobacco Use    Smoking status: Former     Current packs/day: 0.00     Types: Cigarettes     Quit date: 2019     Years since quittin.7    Smokeless tobacco: Former     Quit date: 2019   Vaping Use    Vaping status: Every Day   Substance Use Topics    Alcohol use: No    Drug use: No       Review of Systems   Constitutional:  Negative for activity change, appetite change, chills and fever.   Gastrointestinal:  Negative for abdominal distention, abdominal pain, constipation, diarrhea, nausea and vomiting.   Genitourinary:  Negative for decreased urine volume, difficulty urinating, flank pain, frequency, scrotal swelling and testicular pain.   Musculoskeletal:  Negative for gait problem and myalgias.   Skin:  Positive for wound (abcesss rt buttock).   Neurological:  Negative for weakness, light-headedness and headaches.   All other systems reviewed and are negative.      Physical Exam  Physical Exam  Vitals and nursing note reviewed.   Constitutional:       General: He is not in acute distress.     Appearance: He is not ill-appearing, toxic-appearing or diaphoretic.   HENT:      Head: Normocephalic.      Right Ear: External ear normal.      Left Ear: External ear normal.      Nose: Nose normal.      Mouth/Throat:      Mouth: Mucous membranes are moist.   Eyes:      General:         Right eye: No discharge.         Left eye: No discharge.      Extraocular Movements: Extraocular movements intact.      Conjunctiva/sclera: Conjunctivae normal.      Pupils: Pupils are equal, round, and reactive to light.   Cardiovascular:      Rate and Rhythm: Normal rate.   Pulmonary:       Effort: Pulmonary effort is normal.      Breath sounds: Normal breath sounds.   Abdominal:      General: Bowel sounds are normal. There is no distension.      Tenderness: There is no abdominal tenderness. There is no guarding or rebound.   Genitourinary:     Comments: See media tab.  No overlying cellulitis patient has an area of induration and tenderness at the superior aspect of his gluteal cleft to the right under radiation next stents approximately 2 cm into the right buttock at the superior region there is no significant overlying cellulitis.  There is no fluctuance.  Skin:     General: Skin is warm and dry.   Neurological:      Mental Status: He is alert. Mental status is at baseline.      Cranial Nerves: No cranial nerve deficit.      Sensory: No sensory deficit.      Motor: No weakness.      Coordination: Coordination normal.      Gait: Gait normal.   Psychiatric:         Mood and Affect: Mood normal.         Vital Signs  ED Triage Vitals [06/07/24 1455]   Temperature Pulse Respirations Blood Pressure SpO2   97.7 °F (36.5 °C) 95 18 146/68 96 %      Temp Source Heart Rate Source Patient Position - Orthostatic VS BP Location FiO2 (%)   Temporal Monitor Sitting Right arm --      Pain Score       --           Vitals:    06/07/24 1455   BP: 146/68   Pulse: 95   Patient Position - Orthostatic VS: Sitting         Visual Acuity      ED Medications  Medications   lidocaine-epinephrine (XYLOCAINE/EPINEPHRINE) 1 %-1:100,000 injection 5 mL (5 mL Infiltration Given 6/7/24 1608)   amoxicillin-clavulanate (AUGMENTIN) 875-125 mg per tablet 1 tablet (1 tablet Oral Given 6/7/24 1604)       Diagnostic Studies  Results Reviewed       None                   No orders to display              Procedures  Incision and drain    Date/Time: 6/7/2024 3:50 PM    Performed by: Jesica Barclay MD  Authorized by: Jesica Barclay MD  Universal Protocol:  Consent: Verbal consent obtained.  Consent given by:  patient  Patient identity confirmed: verbally with patient and arm band    Patient location:  ED  Location:     Type:  Abscess    Location:  Anogenital    Anogenital location:  Gluteal cleft (rt superior)  Pre-procedure details:     Skin preparation:  Hibiclens  Anesthesia (see MAR for exact dosages):     Anesthesia method:  Local infiltration    Local anesthetic:  Lidocaine 1% WITH epi  Procedure details:     Complexity:  Simple    Needle aspiration: no      Incision types:  Stab incision    Scalpel blade:  11    Approach:  Open    Incision depth:  Subcutaneous    Wound management:  Probed and deloculated and irrigated with saline    Irrigation with saline:  40 mo    Drainage:  Serosanguinous    Drainage amount:  Scant    Wound treatment:  Wound left open    Packing materials:  None  Post-procedure details:     Patient tolerance of procedure:  Tolerated well, no immediate complications  Comments:      4x4 and Abdm dressing applied           ED Course                               SBIRT 20yo+      Flowsheet Row Most Recent Value   Initial Alcohol Screen: US AUDIT-C     1. How often do you have a drink containing alcohol? 0 Filed at: 06/07/2024 1456   2. How many drinks containing alcohol do you have on a typical day you are drinking?  0 Filed at: 06/07/2024 1456   3a. Male UNDER 65: How often do you have five or more drinks on one occasion? 0 Filed at: 06/07/2024 1456   Audit-C Score 0 Filed at: 06/07/2024 1456   ANN MARIE: How many times in the past year have you...    Used an illegal drug or used a prescription medication for non-medical reasons? Never Filed at: 06/07/2024 1456                      Medical Decision Making  Mdm: Patient underwent point-of-care ultrasound which did not demonstrate a 1 cm hypoechoic consistent with abscess patient is agreeable to proceeding with incision and drainage.  Will cover with Augmentin.  Reviewed extensively reasons for return patient is familiar such as increasing pain redness  hardness of the skin feeling as if he has the flu or myalgias.    Risk  Prescription drug management.             Disposition  Final diagnoses:   Abscess - right superior gluteal s/p I&D     Time reflects when diagnosis was documented in both MDM as applicable and the Disposition within this note       Time User Action Codes Description Comment    6/7/2024  4:00 PM Jesica Barclay Add [L02.91] Abscess     6/7/2024  4:00 PM Jesica Barclay Modify [L02.91] Abscess right superior gluteal s/p I&D          ED Disposition       ED Disposition   Discharge    Condition   Stable    Date/Time   Fri Jun 7, 2024 1600    Comment   Hugh Liuicks discharge to home/self care.                   Follow-up Information       Follow up With Specialties Details Why Contact Info Additional Information    [facility] Emergency Department Emergency Medicine  As needed increased pain redness fever [address] [facility] [address]  [phone] [facility] Emergency Department, [address] [place], [place], [address]    [facility] Colorectal Colon and Rectal Surgery Call in 3 days definitive treatment of recurrent abscess [address] [place], [place] [id number] [phone] [facility] Colorectal, [address] [place] [address], [id number] [phone]            Patient's Medications   Discharge Prescriptions    AMOXICILLIN-CLAVULANATE (AUGMENTIN) 875-125 MG PER TABLET    Take 1 tablet by mouth 2 (two) times a day for 7 days       Start Date: 6/7/2024  End Date: 6/14/2024       Order Dose: 1 tablet       Quantity: 14 tablet    Refills: 0       No discharge procedures on file.    PDMP Review       None            ED Provider  Electronically Signed by             Jesica Barclay MD  06/07/24 5856

## 2024-06-10 ENCOUNTER — HOSPITAL ENCOUNTER (EMERGENCY)
Facility: HOSPITAL | Age: 24
Discharge: HOME | End: 2024-06-11
Attending: EMERGENCY MEDICINE
Payer: COMMERCIAL

## 2024-06-10 VITALS
WEIGHT: 315 LBS | BODY MASS INDEX: 56.12 KG/M2 | DIASTOLIC BLOOD PRESSURE: 87 MMHG | SYSTOLIC BLOOD PRESSURE: 147 MMHG | OXYGEN SATURATION: 98 % | RESPIRATION RATE: 19 BRPM | HEART RATE: 88 BPM | TEMPERATURE: 98 F

## 2024-06-10 DIAGNOSIS — L05.01 PILONIDAL CYST WITH ABSCESS: Primary | ICD-10-CM

## 2024-06-10 PROCEDURE — 99282 EMERGENCY DEPT VISIT SF MDM: CPT

## 2024-06-10 PROCEDURE — 10081 I&D PILONIDAL CYST COMP: CPT | Performed by: EMERGENCY MEDICINE

## 2024-06-10 PROCEDURE — 99284 EMERGENCY DEPT VISIT MOD MDM: CPT | Performed by: EMERGENCY MEDICINE

## 2024-06-10 RX ORDER — LIDOCAINE HYDROCHLORIDE AND EPINEPHRINE 10; 10 MG/ML; UG/ML
10 INJECTION, SOLUTION INFILTRATION; PERINEURAL ONCE
Status: COMPLETED | OUTPATIENT
Start: 2024-06-11 | End: 2024-06-10

## 2024-06-10 RX ADMIN — LIDOCAINE HYDROCHLORIDE,EPINEPHRINE BITARTRATE 10 ML: 10; .01 INJECTION, SOLUTION INFILTRATION; PERINEURAL at 23:53

## 2024-06-11 NOTE — DISCHARGE INSTRUCTIONS
You have been seen for drainage of a pilonidal abscess. Please complete the course of antibiotics as previously prescribed. Take tylenol and motrin as needed for discomfort. Return to the emergency department if you develop worsening pain, redness, fevers or any other symptoms of concern. Please follow up with general surgery by calling the number provided.

## 2024-06-11 NOTE — ED PROVIDER NOTES
History  Chief Complaint   Patient presents with    Cyst     Here for days ago for pilonidyl cyst to be drained. States its filling back up and painful.      Hugh Bass is a 24 y.o. year old male with PMH of pilonidal cyst/abscess presenting to the [facility] ED for evaluation of his pilonidal cyst. Patient seen in ED three days ago at which time he underwent I&D of pilonidal abscess and started on course of augmentin. Since that time patient reporting return of discomfort and feels swelling and repeat accumulation at the site of the pilonidal abscess Patient denies fevers, N/V/D or abdominal pain. No constipation, diarrhea or other issues with bowel movements. Patient has taken tylenol at home for symptomatic treatment. Patient reports compliance with augmentin as previously prescribed.      History provided by:  Medical records and patient   used: No        Prior to Admission Medications   Prescriptions Last Dose Informant Patient Reported? Taking?   FLUoxetine (PROzac) 20 mg capsule   Yes No   Sig: Take 20 mg by mouth daily   Fluoxetine HCl, PMDD, 20 MG TABS   Yes No   Sig: Take 20 mg by mouth daily   amoxicillin-clavulanate (AUGMENTIN) 875-125 mg per tablet   Yes No   Sig: Take 1 tablet by mouth 2 (two) times a day   amoxicillin-clavulanate (AUGMENTIN) 875-125 mg per tablet   No No   Sig: Take 1 tablet by mouth 2 (two) times a day for 7 days   oxyCODONE-acetaminophen (PERCOCET) 5-325 mg per tablet   Yes No   Sig: Take 1 tablet by mouth every 6 (six) hours as needed      Facility-Administered Medications: None       Past Medical History:   Diagnosis Date    Anxiety     Depression     Knee pain, right     Obesity        Past Surgical History:   Procedure Laterality Date    ARTHROSCOPY KNEE Right 11/14/2019    Procedure: ARTHROSCOPY KNEE WITH MPFL REPAIR;  Surgeon: Beba Sibley MD;  Location:  MAIN OR;  Service: Orthopedics    CYST REMOVAL      buttocks        Family History   Problem Relation  Age of Onset    No Known Problems Father      I have reviewed and agree with the history as documented.    E-Cigarette/Vaping    E-Cigarette Use Current Every Day User      E-Cigarette/Vaping Substances    Nicotine No     THC No     CBD No     Flavoring No     Other No     Unknown No      Social History     Tobacco Use    Smoking status: Former     Current packs/day: 0.00     Types: Cigarettes     Quit date: 2019     Years since quittin.7    Smokeless tobacco: Former     Quit date: 2019   Vaping Use    Vaping status: Every Day   Substance Use Topics    Alcohol use: No    Drug use: No       Review of Systems   Constitutional:  Negative for chills and fever.   Respiratory:  Negative for shortness of breath.    Cardiovascular:  Negative for chest pain.   Gastrointestinal:  Negative for abdominal pain, constipation, diarrhea, nausea and vomiting.   Musculoskeletal:  Negative for back pain.   Skin:  Positive for wound.   All other systems reviewed and are negative.      Physical Exam  Physical Exam  Vitals and nursing note reviewed.   Constitutional:       Appearance: He is well-developed.   HENT:      Head: Normocephalic and atraumatic.   Cardiovascular:      Rate and Rhythm: Normal rate and regular rhythm.   Pulmonary:      Effort: Pulmonary effort is normal. No respiratory distress.      Breath sounds: Normal breath sounds. No wheezing or rales.   Abdominal:      Palpations: Abdomen is soft.      Tenderness: There is no abdominal tenderness. There is no guarding or rebound.   Skin:     General: Skin is warm.      Capillary Refill: Capillary refill takes less than 2 seconds.      Findings: Abscess present.          Neurological:      Mental Status: He is alert and oriented to person, place, and time.   Psychiatric:         Mood and Affect: Mood and affect and mood normal.         Behavior: Behavior normal.         Vital Signs  ED Triage Vitals [06/10/24 2343]   Temperature Pulse Respirations Blood  Pressure SpO2   98 °F (36.7 °C) 88 19 147/87 98 %      Temp Source Heart Rate Source Patient Position - Orthostatic VS BP Location FiO2 (%)   Temporal Monitor -- -- --      Pain Score       4           Vitals:    06/10/24 2343   BP: 147/87   Pulse: 88         Visual Acuity      ED Medications  Medications   lidocaine-epinephrine (XYLOCAINE/EPINEPHRINE) 1 %-1:100,000 injection 10 mL (10 mL Infiltration Given 6/10/24 6733)       Diagnostic Studies  Results Reviewed       None                   No orders to display              Procedures  Incision and drain    Date/Time: 6/11/2024 12:03 AM    Performed by: Antoine Gonzalez DO  Authorized by: Antoine Gonzalez DO  Universal Protocol:  Consent: Verbal consent obtained.  Risks and benefits: risks, benefits and alternatives were discussed  Consent given by: patient  Patient understanding: patient states understanding of the procedure being performed  Required items: required blood products, implants, devices, and special equipment available  Patient identity confirmed: verbally with patient    Patient location:  ED  Location:     Type:  Pilonidal cyst    Size:  1    Location:  Anogenital    Anogenital location:  Pilonidal  Anesthesia (see MAR for exact dosages):     Anesthesia method:  Local infiltration    Local anesthetic:  Lidocaine 1% WITH epi  Procedure details:     Complexity:  Simple    Incision types:  Stab incision    Scalpel blade:  11    Incision depth:  Subcutaneous    Wound management:  Probed and deloculated and irrigated with saline    Drainage:  Bloody and purulent    Drainage amount:  Moderate    Wound treatment:  Wound left open    Packing materials:  None  Post-procedure details:     Patient tolerance of procedure:  Tolerated well, no immediate complications           ED Course                               SBIRT 22yo+      Flowsheet Row Most Recent Value   Initial Alcohol Screen: US AUDIT-C     1. How often do you have a drink containing alcohol? 0  Filed at: 06/10/2024 2343   Audit-C Score 0 Filed at: 06/10/2024 2343                      Medical Decision Making    24 y.o. male presenting for evaluation of a pilonidal cyst/abscess.  VSS, nontoxic appearing.  Fluctuant pilonidal cyst right of midline.  No overlying erythema or surrounding cellulitis.  Patient agreeable to repeat I&D which was performed as documented above.    I have discussed with the patient our plan to discharge them from the ED and the patient is in agreement with this plan. The patient was provided a written after visit summary with strict RTED precautions.     Discharge Plan: Encouraged to continue course of antibiotics as previously prescribed, encouraged PRN tylenol/motrin for discomfort.  Will refer to general surgery given recurrent pilonidal cyst/abscess.    Followup: I have discussed with the patient plan to follow up with general surgery. Contact information provided in AVS.    Risk  Prescription drug management.             Disposition  Final diagnoses:   Pilonidal cyst with abscess     Time reflects when diagnosis was documented in both MDM as applicable and the Disposition within this note       Time User Action Codes Description Comment    6/11/2024 12:16 AM Antoine Gonzalez Add [L05.01] Pilonidal cyst with abscess           ED Disposition       ED Disposition   Discharge    Condition   Stable    Date/Time   Tue Jun 11, 2024 0016    Comment   Hugh Bass discharge to home/self care.                   Follow-up Information       Follow up With Specialties Details Why Contact Info    Miller Haines, DO General Surgery, Wound Care Schedule an appointment as soon as possible for a visit  To make appointment for reevaluation in 3-5 days. [address] [facility] [address]  [phone]              Patient's Medications   Discharge Prescriptions    No medications on file           PDMP Review       None            ED Provider  Electronically Signed by             Antoine Gonzalez,  DO  06/11/24 0022

## 2025-01-10 RX ORDER — TIRZEPATIDE 7.5 MG/.5ML
7.5 INJECTION, SOLUTION SUBCUTANEOUS
COMMUNITY
Start: 2025-01-08

## 2025-01-10 RX ORDER — ONDANSETRON 4 MG/1
4 TABLET, ORALLY DISINTEGRATING ORAL
COMMUNITY
Start: 2024-09-04

## 2025-01-13 ENCOUNTER — OFFICE VISIT (OUTPATIENT)
Dept: PODIATRY | Facility: CLINIC | Age: 25
End: 2025-01-13
Payer: COMMERCIAL

## 2025-01-13 VITALS
HEART RATE: 80 BPM | TEMPERATURE: 97.4 F | OXYGEN SATURATION: 98 % | RESPIRATION RATE: 16 BRPM | WEIGHT: 315 LBS | HEIGHT: 69 IN | BODY MASS INDEX: 46.65 KG/M2

## 2025-01-13 DIAGNOSIS — L60.0 INGROWN RIGHT GREATER TOENAIL: Primary | ICD-10-CM

## 2025-01-13 DIAGNOSIS — L60.0 INGROWN LEFT GREATER TOENAIL: ICD-10-CM

## 2025-01-13 PROCEDURE — 99213 OFFICE O/P EST LOW 20 MIN: CPT | Performed by: PODIATRIST

## 2025-01-13 RX ORDER — MUPIROCIN 20 MG/G
OINTMENT TOPICAL DAILY
Qty: 22 G | Refills: 1 | Status: SHIPPED | OUTPATIENT
Start: 2025-01-13

## 2025-01-13 NOTE — PROGRESS NOTES
Name: Hugh Bass      : 2000      MRN: [id number]  Encounter Provider: Kathy Walden DPM  Encounter Date: 2025   Encounter department: [facility]  :  Assessment & Plan  Ingrown right greater toenail    Orders:    mupirocin (BACTROBAN) 2 % ointment; Apply topically daily    Ingrown left greater toenail    Orders:    mupirocin (BACTROBAN) 2 % ointment; Apply topically daily      Bilateral lateral great toe ingrown nails without SOI  POD notes from 2023 reviewed  Slant back performed bilateral great toes along lateral nail borders  Rx mupirocin ointment to affected areas daily  Soak daily in Epsom salts and warm water 15 minutes   Wear wide toebox shoes to prevent pressure and irritation on the areas  Counseled on clinical signs of infection, will contact the office or present to the ED if these occur  Ingrown toenail treatments reviewed at length today.  Patient is finishing up clinicals at school and would like to go with minimal treatment at this time.  Patient will plan for permanent partial nail avulsions bilaterally in the future  Follow-up 2 weeks for evaluation    History of Present Illness   HPI  Hugh Bass is a 24 y.o. male who presents for evaluation management of bilateral great toes due to chronic ingrown toenails.  Patient states that typically his right great toe bothers him along the lateral aspect and he is able to treat it at home.  He is now having pain and irritation along the left great toe laterally.  Both sides have mild scabbing and serosanguineous drainage noted.  Trace edema and erythema noted.  Patient denies any purulence.  Patient states areas are mildly tender.  He has not been soaking his feet in Epsom salts.  He has not been treating the area with antibiotic ointment.  Patient denies nausea, vomiting, fever, chills, shortness of breath      Review of Systems   Constitutional:  Negative for chills and fever.   HENT:  Negative for sore  "throat.    Respiratory:  Negative for cough and shortness of breath.    Gastrointestinal:  Negative for vomiting.   Musculoskeletal:  Negative for arthralgias and back pain.   Skin:  Negative for color change and rash.   All other systems reviewed and are negative.         Objective   Pulse 80   Temp (!) 97.4 °F (36.3 °C)   Resp 16   Ht 5' 9\" (1.753 m)   Wt (!) 172 kg (380 lb)   SpO2 98%   BMI 56.12 kg/m²      Physical Exam  Constitutional:       General: He is not in acute distress.     Appearance: He is not ill-appearing or toxic-appearing.   Cardiovascular:      Comments: P/PT pulse 2/4 BL  Pedal hair growth present  CRT less than 3 seconds to distal digits BL  Pulmonary:      Effort: No respiratory distress.   Musculoskeletal:         General: Swelling and tenderness present.      Comments: Mild tenderness with palpation of great toe lateral nail borders   Skin:     Capillary Refill: Capillary refill takes less than 2 seconds.      Findings: Erythema present.      Comments: Bilateral great toe lateral nail borders with mild erythema and edema.  Mild serosanguineous crusting noted.  No purulence expressed, no further drainage expressed.  No crepitus, no fluctuance, no malodor noted   Neurological:      Sensory: No sensory deficit.           "

## 2025-03-18 ENCOUNTER — HOSPITAL ENCOUNTER (EMERGENCY)
Facility: HOSPITAL | Age: 25
Discharge: HOME | End: 2025-03-19
Attending: EMERGENCY MEDICINE | Admitting: EMERGENCY MEDICINE
Payer: COMMERCIAL

## 2025-03-18 DIAGNOSIS — R10.11 RIGHT UPPER QUADRANT ABDOMINAL PAIN: Primary | ICD-10-CM

## 2025-03-18 PROCEDURE — 99284 EMERGENCY DEPT VISIT MOD MDM: CPT

## 2025-03-19 ENCOUNTER — APPOINTMENT (EMERGENCY)
Dept: CT IMAGING | Facility: HOSPITAL | Age: 25
End: 2025-03-19
Payer: COMMERCIAL

## 2025-03-19 VITALS
BODY MASS INDEX: 51.39 KG/M2 | SYSTOLIC BLOOD PRESSURE: 147 MMHG | HEART RATE: 65 BPM | DIASTOLIC BLOOD PRESSURE: 72 MMHG | RESPIRATION RATE: 18 BRPM | WEIGHT: 315 LBS | TEMPERATURE: 97.4 F | OXYGEN SATURATION: 98 %

## 2025-03-19 LAB
ALBUMIN SERPL BCG-MCNC: 4.8 G/DL (ref 3.5–5)
ALP SERPL-CCNC: 52 U/L (ref 34–104)
ALT SERPL W P-5'-P-CCNC: 32 U/L (ref 7–52)
ANION GAP SERPL CALCULATED.3IONS-SCNC: 9 MMOL/L (ref 4–13)
AST SERPL W P-5'-P-CCNC: 18 U/L (ref 13–39)
BASOPHILS # BLD AUTO: 0.06 THOUSANDS/ÂΜL (ref 0–0.1)
BASOPHILS NFR BLD AUTO: 1 % (ref 0–1)
BILIRUB SERPL-MCNC: 0.36 MG/DL (ref 0.2–1)
BUN SERPL-MCNC: 14 MG/DL (ref 5–25)
CALCIUM SERPL-MCNC: 10 MG/DL (ref 8.4–10.2)
CHLORIDE SERPL-SCNC: 100 MMOL/L (ref 96–108)
CO2 SERPL-SCNC: 29 MMOL/L (ref 21–32)
CREAT SERPL-MCNC: 0.85 MG/DL (ref 0.6–1.3)
EOSINOPHIL # BLD AUTO: 0.17 THOUSAND/ÂΜL (ref 0–0.61)
EOSINOPHIL NFR BLD AUTO: 2 % (ref 0–6)
ERYTHROCYTE [DISTWIDTH] IN BLOOD BY AUTOMATED COUNT: 12.8 % (ref 11.6–15.1)
GFR SERPL CREATININE-BSD FRML MDRD: 122 ML/MIN/1.73SQ M
GLUCOSE SERPL-MCNC: 87 MG/DL (ref 65–140)
HCT VFR BLD AUTO: 41.5 % (ref 36.5–49.3)
HGB BLD-MCNC: 13.9 G/DL (ref 12–17)
IMM GRANULOCYTES # BLD AUTO: 0.04 THOUSAND/UL (ref 0–0.2)
IMM GRANULOCYTES NFR BLD AUTO: 0 % (ref 0–2)
LIPASE SERPL-CCNC: 28 U/L (ref 11–82)
LYMPHOCYTES # BLD AUTO: 3.92 THOUSANDS/ÂΜL (ref 0.6–4.47)
LYMPHOCYTES NFR BLD AUTO: 35 % (ref 14–44)
MCH RBC QN AUTO: 29.6 PG (ref 26.8–34.3)
MCHC RBC AUTO-ENTMCNC: 33.5 G/DL (ref 31.4–37.4)
MCV RBC AUTO: 88 FL (ref 82–98)
MONOCYTES # BLD AUTO: 0.7 THOUSAND/ÂΜL (ref 0.17–1.22)
MONOCYTES NFR BLD AUTO: 6 % (ref 4–12)
NEUTROPHILS # BLD AUTO: 6.31 THOUSANDS/ÂΜL (ref 1.85–7.62)
NEUTS SEG NFR BLD AUTO: 56 % (ref 43–75)
NRBC BLD AUTO-RTO: 0 /100 WBCS
PLATELET # BLD AUTO: 349 THOUSANDS/UL (ref 149–390)
PMV BLD AUTO: 9.2 FL (ref 8.9–12.7)
POTASSIUM SERPL-SCNC: 3.5 MMOL/L (ref 3.5–5.3)
PROT SERPL-MCNC: 7.9 G/DL (ref 6.4–8.4)
RBC # BLD AUTO: 4.7 MILLION/UL (ref 3.88–5.62)
SODIUM SERPL-SCNC: 138 MMOL/L (ref 135–147)
WBC # BLD AUTO: 11.2 THOUSAND/UL (ref 4.31–10.16)

## 2025-03-19 PROCEDURE — 36415 COLL VENOUS BLD VENIPUNCTURE: CPT | Performed by: EMERGENCY MEDICINE

## 2025-03-19 PROCEDURE — 85025 COMPLETE CBC W/AUTO DIFF WBC: CPT | Performed by: EMERGENCY MEDICINE

## 2025-03-19 PROCEDURE — 96374 THER/PROPH/DIAG INJ IV PUSH: CPT

## 2025-03-19 PROCEDURE — 83690 ASSAY OF LIPASE: CPT | Performed by: EMERGENCY MEDICINE

## 2025-03-19 PROCEDURE — 99284 EMERGENCY DEPT VISIT MOD MDM: CPT | Performed by: EMERGENCY MEDICINE

## 2025-03-19 PROCEDURE — 74177 CT ABD & PELVIS W/CONTRAST: CPT

## 2025-03-19 PROCEDURE — 80053 COMPREHEN METABOLIC PANEL: CPT | Performed by: EMERGENCY MEDICINE

## 2025-03-19 RX ORDER — SUCRALFATE 1 G/1
1 TABLET ORAL 4 TIMES DAILY
Qty: 56 TABLET | Refills: 0 | Status: SHIPPED | OUTPATIENT
Start: 2025-03-19 | End: 2025-04-02

## 2025-03-19 RX ORDER — FAMOTIDINE 20 MG/1
20 TABLET, FILM COATED ORAL 2 TIMES DAILY
Qty: 30 TABLET | Refills: 0 | Status: SHIPPED | OUTPATIENT
Start: 2025-03-19

## 2025-03-19 RX ORDER — KETOROLAC TROMETHAMINE 30 MG/ML
15 INJECTION, SOLUTION INTRAMUSCULAR; INTRAVENOUS ONCE
Status: COMPLETED | OUTPATIENT
Start: 2025-03-19 | End: 2025-03-19

## 2025-03-19 RX ADMIN — KETOROLAC TROMETHAMINE 15 MG: 30 INJECTION, SOLUTION INTRAMUSCULAR at 00:11

## 2025-03-19 RX ADMIN — IOHEXOL 100 ML: 350 INJECTION, SOLUTION INTRAVENOUS at 01:12

## 2025-03-19 NOTE — ED PROVIDER NOTES
Time reflects when diagnosis was documented in both MDM as applicable and the Disposition within this note       Time User Action Codes Description Comment    3/19/2025  1:50 AM Yair Watts Add [R10.11] Right upper quadrant abdominal pain           ED Disposition       ED Disposition   Discharge    Condition   Stable    Date/Time   Wed Mar 19, 2025  1:50 AM    Comment   Hugh Bass discharge to home/self care.                   Assessment & Plan       Medical Decision Making  I reviewed the patient's medical chart, PMHx, prior encounters, medications.    My DDx includes: Gastritis, PUD, cholecystitis, pancreatitis, appendicitis, viral syndrome (including covid, flu, RSV).     Will obtain GI labs including CBC, CMP to evaluate electrolytes and kidney function, lipase to evaluate pancreas. Will treat supportively, reassess.     0028 WBC(!): 11.20  Mild non-specific leukocytosis    CT scan negative except for some mild dilation of the stomach, suspect this is side effect of patient's zepbound. Will dc with strict return precautions. Recommend PCP follow up.    Amount and/or Complexity of Data Reviewed  Labs: ordered. Decision-making details documented in ED Course.  Radiology: ordered.    Risk  Prescription drug management.        ED Course as of 03/19/25 0230   Wed Mar 19, 2025   0028 WBC(!): 11.20  Mild non-specific leukocytosis       Medications   ketorolac (TORADOL) injection 15 mg (15 mg Intravenous Given 3/19/25 0011)   iohexol (OMNIPAQUE) 350 MG/ML injection (SINGLE-DOSE) 100 mL (100 mL Intravenous Given 3/19/25 0112)       ED Risk Strat Scores                            SBIRT 20yo+      Flowsheet Row Most Recent Value   Initial Alcohol Screen: US AUDIT-C     1. How often do you have a drink containing alcohol? 0 Filed at: 03/19/2025 0003   2. How many drinks containing alcohol do you have on a typical day you are drinking?  0 Filed at: 03/19/2025 0003   3a. Male UNDER 65: How often do you have five  "or more drinks on one occasion? 0 Filed at: 2025 0003   Audit-C Score 0 Filed at: 2025 0003   ANN MARIE: How many times in the past year have you...    Used an illegal drug or used a prescription medication for non-medical reasons? Never Filed at: 2025 0003                            History of Present Illness       Chief Complaint   Patient presents with    Abdominal Pain     Patient presents to ED from home w/c/o abdominal pain and \"sulfur\" burps x 2-3 days, cramping, loss of appetite, denies n/v/d/fever/chills, hx umbilical hernia repair, started zepbound 2024 no recent change in dose however did not take last week and restarted friday       Past Medical History:   Diagnosis Date    Anxiety     Depression     Knee pain, right     Obesity       Past Surgical History:   Procedure Laterality Date    ARTHROSCOPY KNEE Right 2019    Procedure: ARTHROSCOPY KNEE WITH MPFL REPAIR;  Surgeon: Beba Sibley MD;  Location:  MAIN OR;  Service: Orthopedics    CYST REMOVAL      buttocks       Family History   Problem Relation Age of Onset    No Known Problems Father       Social History     Tobacco Use    Smoking status: Former     Current packs/day: 0.00     Types: Cigarettes     Quit date: 2019     Years since quittin.5    Smokeless tobacco: Former     Quit date: 2019   Vaping Use    Vaping status: Every Day   Substance Use Topics    Alcohol use: No    Drug use: No      E-Cigarette/Vaping    E-Cigarette Use Current Every Day User       E-Cigarette/Vaping Substances    Nicotine No     THC No     CBD No     Flavoring No     Other No     Unknown No       I have reviewed and agree with the history as documented.     24-year-old male, on Zepbound, who presents for right upper quadrant pain.  Patient reports this been going on for the past 2 to 3 days.  Describes it largely is persistent, slightly worsening since it started.  Denies any worsening with food.  Does report taking antacid without any " relief.  He denies nausea or vomiting, does report persistent belching.  Denies diarrhea.  No dysuria or frequency.  No back pain.  ROS otherwise negative.        Review of Systems   Constitutional:  Negative for chills, diaphoresis, fatigue and fever.   HENT:  Negative for congestion and sore throat.    Eyes:  Negative for visual disturbance.   Respiratory:  Negative for cough, chest tightness and shortness of breath.    Cardiovascular:  Negative for chest pain, palpitations and leg swelling.   Gastrointestinal:  Positive for abdominal pain. Negative for abdominal distention, constipation, diarrhea, nausea and vomiting.   Genitourinary:  Negative for difficulty urinating and dysuria.   Musculoskeletal:  Negative for arthralgias and myalgias.   Skin:  Negative for rash.   Neurological:  Negative for dizziness, weakness, light-headedness, numbness and headaches.   Psychiatric/Behavioral:  Negative for agitation, behavioral problems and confusion. The patient is not nervous/anxious.    All other systems reviewed and are negative.          Objective       ED Triage Vitals [03/19/25 0000]   Temperature Pulse Blood Pressure Respirations SpO2 Patient Position - Orthostatic VS   97.6 °F (36.4 °C) 83 143/80 18 98 % --      Temp Source Heart Rate Source BP Location FiO2 (%) Pain Score    Temporal -- -- -- No Pain      Vitals      Date and Time Temp Pulse SpO2 Resp BP Pain Score FACES Pain Rating User   03/19/25 0155 97.4 °F (36.3 °C) 65 98 % 18 147/72 No Pain -- SH   03/19/25 0011 -- -- -- -- -- 6 -- CD   03/19/25 0003 -- -- 98 % -- -- -- -- LC   03/19/25 0000 97.6 °F (36.4 °C) 83 98 % 18 143/80 No Pain -- LC            Physical Exam  Constitutional:       Appearance: He is well-developed.   HENT:      Head: Normocephalic and atraumatic.   Cardiovascular:      Rate and Rhythm: Normal rate and regular rhythm.      Heart sounds: Normal heart sounds. No murmur heard.  Pulmonary:      Effort: Pulmonary effort is normal. No  respiratory distress.      Breath sounds: Normal breath sounds.   Abdominal:      General: Bowel sounds are normal. There is no distension.      Palpations: Abdomen is soft.      Tenderness: There is abdominal tenderness in the right upper quadrant.   Musculoskeletal:         General: No deformity.   Skin:     General: Skin is warm.      Findings: No rash.   Neurological:      Mental Status: He is alert and oriented to person, place, and time.   Psychiatric:         Behavior: Behavior normal.         Thought Content: Thought content normal.         Judgment: Judgment normal.         Results Reviewed       Procedure Component Value Units Date/Time    Comprehensive metabolic panel [[id number]] Collected: 03/19/25 0013    Lab Status: Final result Specimen: Blood from Arm, Right Updated: 03/19/25 0040     Sodium 138 mmol/L      Potassium 3.5 mmol/L      Chloride 100 mmol/L      CO2 29 mmol/L      ANION GAP 9 mmol/L      BUN 14 mg/dL      Creatinine 0.85 mg/dL      Glucose 87 mg/dL      Calcium 10.0 mg/dL      AST 18 U/L      ALT 32 U/L      Alkaline Phosphatase 52 U/L      Total Protein 7.9 g/dL      Albumin 4.8 g/dL      Total Bilirubin 0.36 mg/dL      eGFR 122 ml/min/1.73sq m     Narrative:      National Kid[employer] Foundation guidelines for Chronic Kidney Disease (CKD):     Stage 1 with normal or high GFR (GFR > 90 mL/min/1.73 square meters)    Stage 2 Mild CKD (GFR = 60-89 mL/min/1.73 square meters)    Stage 3A Moderate CKD (GFR = 45-59 mL/min/1.73 square meters)    Stage 3B Moderate CKD (GFR = 30-44 mL/min/1.73 square meters)    Stage 4 Severe CKD (GFR = 15-29 mL/min/1.73 square meters)    Stage 5 End Stage CKD (GFR <15 mL/min/1.73 square meters)  Note: GFR calculation is accurate only with a steady state creatinine    Lipase [[id number]]  (Normal) Collected: 03/19/25 0013    Lab Status: Final result Specimen: Blood from Arm, Right Updated: 03/19/25 0040     Lipase 28 u/L     CBC and differential [[id number]]   (Abnormal) Collected: 03/19/25 0013    Lab Status: Final result Specimen: Blood from Arm, Right Updated: 03/19/25 0027     WBC 11.20 Thousand/uL      RBC 4.70 Million/uL      Hemoglobin 13.9 g/dL      Hematocrit 41.5 %      MCV 88 fL      MCH 29.6 pg      MCHC 33.5 g/dL      RDW 12.8 %      MPV 9.2 fL      Platelets 349 Thousands/uL      nRBC 0 /100 WBCs      Segmented % 56 %      Immature Grans % 0 %      Lymphocytes % 35 %      Monocytes % 6 %      Eosinophils Relative 2 %      Basophils Relative 1 %      Absolute Neutrophils 6.31 Thousands/µL      Absolute Immature Grans 0.04 Thousand/uL      Absolute Lymphocytes 3.92 Thousands/µL      Absolute Monocytes 0.70 Thousand/µL      Eosinophils Absolute 0.17 Thousand/µL      Basophils Absolute 0.06 Thousands/µL             CT abdomen pelvis with contrast   Final Interpretation by Harjit Cabrera MD (03/19 0145)      No acute intra-abdominal/pelvic abnormalities noted with findings detailed above.         Workstation performed: [id number]             Procedures    ED Medication and Procedure Management   Prior to Admission Medications   Prescriptions Last Dose Informant Patient Reported? Taking?   FLUoxetine (PROzac) 20 mg capsule  Self Yes No   Sig: Take 20 mg by mouth daily   Fluoxetine HCl, PMDD, 20 MG TABS  Self Yes No   Sig: Take 20 mg by mouth daily   amoxicillin-clavulanate (AUGMENTIN) 875-125 mg per tablet  Self Yes No   Sig: Take 1 tablet by mouth 2 (two) times a day   Patient not taking: Reported on 1/13/2025   mupirocin (BACTROBAN) 2 % ointment   No No   Sig: Apply topically daily   ondansetron (ZOFRAN-ODT) 4 mg disintegrating tablet  Self Yes No   Sig: Take 4 mg by mouth   oxyCODONE-acetaminophen (PERCOCET) 5-325 mg per tablet  Self Yes No   Sig: Take 1 tablet by mouth every 6 (six) hours as needed   Patient not taking: Reported on 1/13/2025   tirzepatide (Zepbound) 7.5 mg/0.5 mL auto-injector  Self Yes No   Sig: Inject 7.5 mg under the skin       Facility-Administered Medications: None     Discharge Medication List as of 3/19/2025  1:51 AM        START taking these medications    Details   famotidine (PEPCID) 20 mg tablet Take 1 tablet (20 mg total) by mouth 2 (two) times a day, Starting Wed 3/19/2025, Normal      sucralfate (CARAFATE) 1 g tablet Take 1 tablet (1 g total) by mouth 4 (four) times a day for 14 days, Starting Wed 3/19/2025, Until Wed 4/2/2025, Normal           CONTINUE these medications which have NOT CHANGED    Details   amoxicillin-clavulanate (AUGMENTIN) 875-125 mg per tablet Take 1 tablet by mouth 2 (two) times a day, Starting Wed 7/12/2023, Historical Med      FLUoxetine (PROzac) 20 mg capsule Take 20 mg by mouth daily, Historical Med      Fluoxetine HCl, PMDD, 20 MG TABS Take 20 mg by mouth daily, Historical Med      mupirocin (BACTROBAN) 2 % ointment Apply topically daily, Starting Mon 1/13/2025, Normal      ondansetron (ZOFRAN-ODT) 4 mg disintegrating tablet Take 4 mg by mouth, Starting Wed 9/4/2024, Historical Med      oxyCODONE-acetaminophen (PERCOCET) 5-325 mg per tablet Take 1 tablet by mouth every 6 (six) hours as needed, Starting Fri 9/15/2023, Historical Med      tirzepatide (Zepbound) 7.5 mg/0.5 mL auto-injector Inject 7.5 mg under the skin, Starting Wed 1/8/2025, Historical Med           No discharge procedures on file.  ED SEPSIS DOCUMENTATION   Time reflects when diagnosis was documented in both MDM as applicable and the Disposition within this note       Time User Action Codes Description Comment    3/19/2025  1:50 AM Yair Watts Add [R10.11] Right upper quadrant abdominal pain                  Yair Watts MD  03/19/25 023

## 2025-08-15 ENCOUNTER — TELEPHONE (OUTPATIENT)
Age: 25
End: 2025-08-15

## (undated) DEVICE — STERLING XTRASHARP SHAVER GREAT WHITE SHAVER BLADE, 4.2 MM: Brand: STERLING XTRASHARP SHAVER GREAT WHITE

## (undated) DEVICE — OCCLUSIVE GAUZE STRIP,3% BISMUTH TRIBROMOPHENATE IN PETROLATUM BLEND: Brand: XEROFORM

## (undated) DEVICE — DRAPE SHEET THREE QUARTER

## (undated) DEVICE — BETHLEHEM UNIVERSAL  ARTHRO PK: Brand: CARDINAL HEALTH

## (undated) DEVICE — GLOVE INDICATOR PI UNDERGLOVE SZ 7.5 BLUE

## (undated) DEVICE — SYRINGE 20ML LL

## (undated) DEVICE — TRANSPOSAL ULTRAFLEX DUO/QUAD ULTRA CART MANIFOLD

## (undated) DEVICE — PADDING CAST 6IN COTTON STRL

## (undated) DEVICE — ACE WRAP 6 IN UNSTERILE

## (undated) DEVICE — TUBING SUCTION 5MM X 12 FT

## (undated) DEVICE — STRAIGHT CATH RED RUBBER 12FR

## (undated) DEVICE — STOCKINETTE,IMPERVIOUS,12X48,STERILE: Brand: MEDLINE

## (undated) DEVICE — NEEDLE 22 G X 1 1/2 SAFETY

## (undated) DEVICE — SUT ETHILON 4-0 PS-2 18 IN 1667H

## (undated) DEVICE — INTENDED FOR TISSUE SEPARATION, AND OTHER PROCEDURES THAT REQUIRE A SHARP SURGICAL BLADE TO PUNCTURE OR CUT.: Brand: BARD-PARKER ® CARBON RIB-BACK BLADES

## (undated) DEVICE — NEEDLE 18 G X 1 1/2

## (undated) DEVICE — PREP SURGICAL PURPREP 26ML

## (undated) DEVICE — SYRINGE 3ML LL

## (undated) DEVICE — ELECTRODE ELECSURG SUPER TURBOVAC 90 3.75MM X 5.4 IN

## (undated) DEVICE — CUFF TOURNIQUET 34 X 4 IN QUICK CONNECT DISP 1BLA

## (undated) DEVICE — TUBING ARTHROSCOPIC WAVE  MAIN PUMP

## (undated) DEVICE — GLOVE SRG BIOGEL 7

## (undated) DEVICE — STERLING XTRASHARP SHAVER GREAT WHITE SHAVER BLADE, 3.5 MM: Brand: STERLING XTRASHARP SHAVER GREAT WHITE